# Patient Record
Sex: FEMALE | Race: WHITE | NOT HISPANIC OR LATINO | Employment: UNEMPLOYED | URBAN - METROPOLITAN AREA
[De-identification: names, ages, dates, MRNs, and addresses within clinical notes are randomized per-mention and may not be internally consistent; named-entity substitution may affect disease eponyms.]

---

## 2023-01-01 ENCOUNTER — NURSING HOME VISIT (OUTPATIENT)
Dept: POST ACUTE CARE | Facility: EXTERNAL LOCATION | Age: 78
End: 2023-01-01
Payer: MEDICARE

## 2023-01-01 VITALS
BODY MASS INDEX: 17.76 KG/M2 | DIASTOLIC BLOOD PRESSURE: 64 MMHG | TEMPERATURE: 97.6 F | SYSTOLIC BLOOD PRESSURE: 112 MMHG | OXYGEN SATURATION: 94 % | RESPIRATION RATE: 18 BRPM | HEART RATE: 76 BPM | WEIGHT: 94 LBS

## 2023-01-01 VITALS
TEMPERATURE: 98.1 F | OXYGEN SATURATION: 93 % | RESPIRATION RATE: 18 BRPM | SYSTOLIC BLOOD PRESSURE: 132 MMHG | DIASTOLIC BLOOD PRESSURE: 78 MMHG | WEIGHT: 94 LBS | BODY MASS INDEX: 17.76 KG/M2 | HEART RATE: 76 BPM

## 2023-01-01 VITALS
WEIGHT: 96 LBS | HEART RATE: 64 BPM | TEMPERATURE: 97.8 F | BODY MASS INDEX: 16.59 KG/M2 | RESPIRATION RATE: 18 BRPM | SYSTOLIC BLOOD PRESSURE: 112 MMHG | DIASTOLIC BLOOD PRESSURE: 64 MMHG | OXYGEN SATURATION: 95 %

## 2023-01-01 VITALS
DIASTOLIC BLOOD PRESSURE: 65 MMHG | WEIGHT: 95 LBS | OXYGEN SATURATION: 95 % | TEMPERATURE: 97.9 F | BODY MASS INDEX: 17.95 KG/M2 | RESPIRATION RATE: 18 BRPM | HEART RATE: 74 BPM | SYSTOLIC BLOOD PRESSURE: 117 MMHG

## 2023-01-01 VITALS
OXYGEN SATURATION: 96 % | HEART RATE: 64 BPM | SYSTOLIC BLOOD PRESSURE: 112 MMHG | RESPIRATION RATE: 18 BRPM | TEMPERATURE: 97.9 F | BODY MASS INDEX: 16.07 KG/M2 | DIASTOLIC BLOOD PRESSURE: 64 MMHG | WEIGHT: 93 LBS

## 2023-01-01 VITALS
SYSTOLIC BLOOD PRESSURE: 112 MMHG | DIASTOLIC BLOOD PRESSURE: 64 MMHG | OXYGEN SATURATION: 96 % | HEART RATE: 64 BPM | BODY MASS INDEX: 16.42 KG/M2 | WEIGHT: 95 LBS | RESPIRATION RATE: 18 BRPM | TEMPERATURE: 97.9 F

## 2023-01-01 VITALS
OXYGEN SATURATION: 95 % | TEMPERATURE: 98 F | BODY MASS INDEX: 17.76 KG/M2 | SYSTOLIC BLOOD PRESSURE: 117 MMHG | RESPIRATION RATE: 18 BRPM | HEART RATE: 74 BPM | WEIGHT: 94 LBS | DIASTOLIC BLOOD PRESSURE: 65 MMHG

## 2023-01-01 VITALS
RESPIRATION RATE: 18 BRPM | TEMPERATURE: 98.1 F | HEART RATE: 78 BPM | OXYGEN SATURATION: 94 % | SYSTOLIC BLOOD PRESSURE: 133 MMHG | DIASTOLIC BLOOD PRESSURE: 63 MMHG | BODY MASS INDEX: 16.77 KG/M2 | WEIGHT: 97 LBS

## 2023-01-01 VITALS
BODY MASS INDEX: 16.25 KG/M2 | OXYGEN SATURATION: 96 % | RESPIRATION RATE: 18 BRPM | HEART RATE: 64 BPM | SYSTOLIC BLOOD PRESSURE: 112 MMHG | TEMPERATURE: 98.5 F | DIASTOLIC BLOOD PRESSURE: 64 MMHG | WEIGHT: 94 LBS

## 2023-01-01 VITALS
WEIGHT: 95 LBS | SYSTOLIC BLOOD PRESSURE: 107 MMHG | DIASTOLIC BLOOD PRESSURE: 82 MMHG | TEMPERATURE: 98.2 F | HEART RATE: 58 BPM | OXYGEN SATURATION: 97 % | RESPIRATION RATE: 16 BRPM | BODY MASS INDEX: 17.95 KG/M2

## 2023-01-01 VITALS
SYSTOLIC BLOOD PRESSURE: 107 MMHG | DIASTOLIC BLOOD PRESSURE: 82 MMHG | WEIGHT: 92 LBS | BODY MASS INDEX: 17.38 KG/M2 | OXYGEN SATURATION: 97 % | TEMPERATURE: 97.8 F | RESPIRATION RATE: 16 BRPM | HEART RATE: 58 BPM

## 2023-01-01 VITALS
WEIGHT: 97 LBS | HEART RATE: 64 BPM | SYSTOLIC BLOOD PRESSURE: 112 MMHG | DIASTOLIC BLOOD PRESSURE: 64 MMHG | TEMPERATURE: 98 F | RESPIRATION RATE: 18 BRPM | OXYGEN SATURATION: 95 % | BODY MASS INDEX: 16.77 KG/M2

## 2023-01-01 VITALS
SYSTOLIC BLOOD PRESSURE: 132 MMHG | WEIGHT: 93 LBS | HEART RATE: 76 BPM | OXYGEN SATURATION: 93 % | TEMPERATURE: 98.1 F | DIASTOLIC BLOOD PRESSURE: 78 MMHG | RESPIRATION RATE: 18 BRPM | BODY MASS INDEX: 17.57 KG/M2

## 2023-01-01 VITALS
HEART RATE: 68 BPM | BODY MASS INDEX: 18.14 KG/M2 | WEIGHT: 96 LBS | RESPIRATION RATE: 18 BRPM | TEMPERATURE: 98.3 F | SYSTOLIC BLOOD PRESSURE: 122 MMHG | OXYGEN SATURATION: 97 % | DIASTOLIC BLOOD PRESSURE: 70 MMHG

## 2023-01-01 DIAGNOSIS — R53.81 DEBILITY: ICD-10-CM

## 2023-01-01 DIAGNOSIS — D32.9 MENINGIOMA (MULTI): ICD-10-CM

## 2023-01-01 DIAGNOSIS — F02.C0 SEVERE ALZHEIMER'S DEMENTIA WITHOUT BEHAVIORAL DISTURBANCE, PSYCHOTIC DISTURBANCE, MOOD DISTURBANCE, OR ANXIETY, UNSPECIFIED TIMING OF DEMENTIA ONSET (MULTI): ICD-10-CM

## 2023-01-01 DIAGNOSIS — R56.9 SEIZURES (MULTI): ICD-10-CM

## 2023-01-01 DIAGNOSIS — G30.9 SEVERE ALZHEIMER'S DEMENTIA WITHOUT BEHAVIORAL DISTURBANCE, PSYCHOTIC DISTURBANCE, MOOD DISTURBANCE, OR ANXIETY, UNSPECIFIED TIMING OF DEMENTIA ONSET (MULTI): Primary | ICD-10-CM

## 2023-01-01 DIAGNOSIS — G30.9 SEVERE ALZHEIMER'S DEMENTIA WITHOUT BEHAVIORAL DISTURBANCE, PSYCHOTIC DISTURBANCE, MOOD DISTURBANCE, OR ANXIETY, UNSPECIFIED TIMING OF DEMENTIA ONSET (MULTI): ICD-10-CM

## 2023-01-01 DIAGNOSIS — D32.9 MENINGIOMA (MULTI): Primary | ICD-10-CM

## 2023-01-01 DIAGNOSIS — R56.9 SEIZURES (MULTI): Primary | ICD-10-CM

## 2023-01-01 DIAGNOSIS — M81.0 OSTEOPOROSIS, UNSPECIFIED OSTEOPOROSIS TYPE, UNSPECIFIED PATHOLOGICAL FRACTURE PRESENCE: ICD-10-CM

## 2023-01-01 DIAGNOSIS — F02.C0 SEVERE ALZHEIMER'S DEMENTIA WITHOUT BEHAVIORAL DISTURBANCE, PSYCHOTIC DISTURBANCE, MOOD DISTURBANCE, OR ANXIETY, UNSPECIFIED TIMING OF DEMENTIA ONSET (MULTI): Primary | ICD-10-CM

## 2023-01-01 PROCEDURE — 99309 SBSQ NF CARE MODERATE MDM 30: CPT | Performed by: NURSE PRACTITIONER

## 2023-01-01 PROCEDURE — 99309 SBSQ NF CARE MODERATE MDM 30: CPT | Performed by: INTERNAL MEDICINE

## 2023-01-01 PROCEDURE — 99308 SBSQ NF CARE LOW MDM 20: CPT | Performed by: INTERNAL MEDICINE

## 2023-01-01 RX ORDER — LACOSAMIDE 100 MG/1
100 TABLET ORAL 2 TIMES DAILY
Qty: 360 TABLET | Refills: 0 | Status: SHIPPED | OUTPATIENT
Start: 2023-01-01 | End: 2024-01-01 | Stop reason: SDUPTHER

## 2023-01-01 RX ORDER — LACOSAMIDE 100 MG/1
100 TABLET ORAL 2 TIMES DAILY
Qty: 360 TABLET | Refills: 0 | Status: SHIPPED | OUTPATIENT
Start: 2023-01-01 | End: 2023-01-01 | Stop reason: SDUPTHER

## 2023-01-01 RX ORDER — LACOSAMIDE 100 MG/1
100 TABLET ORAL 2 TIMES DAILY
COMMUNITY
Start: 2021-09-15 | End: 2023-01-01 | Stop reason: SDUPTHER

## 2023-01-01 ASSESSMENT — ENCOUNTER SYMPTOMS
CHILLS: 0
DIARRHEA: 0
COUGH: 0
MYALGIAS: 0
VOMITING: 0
CONSTIPATION: 0
VOMITING: 0
VOMITING: 0
JOINT SWELLING: 0
FEVER: 0
FEVER: 0
CHILLS: 0
SHORTNESS OF BREATH: 0
MYALGIAS: 0
CONSTIPATION: 0
NAUSEA: 0
VOMITING: 0
COUGH: 0
CONSTIPATION: 0
SHORTNESS OF BREATH: 0
CHILLS: 0
MYALGIAS: 0
SHORTNESS OF BREATH: 0
FEVER: 0
DIAPHORESIS: 0
DIARRHEA: 0
DIAPHORESIS: 0
MYALGIAS: 0
NAUSEA: 0
COUGH: 0
FEVER: 0
DIAPHORESIS: 0
FEVER: 0
COUGH: 0
MYALGIAS: 0
CONSTIPATION: 0
SHORTNESS OF BREATH: 0
VOMITING: 0
JOINT SWELLING: 0
CHILLS: 0
CHILLS: 0
COUGH: 0
JOINT SWELLING: 0
JOINT SWELLING: 0
DIAPHORESIS: 0
VOMITING: 0
DIAPHORESIS: 0
FEVER: 0
DIARRHEA: 0
SHORTNESS OF BREATH: 0
DIARRHEA: 0
DIAPHORESIS: 0
DIARRHEA: 0
DIARRHEA: 0
NAUSEA: 0
MYALGIAS: 0
CONSTIPATION: 0
NAUSEA: 0
CONSTIPATION: 0
SHORTNESS OF BREATH: 0
COUGH: 0
CHILLS: 0

## 2023-03-11 PROBLEM — D32.9 MENINGIOMA (MULTI): Status: ACTIVE | Noted: 2023-03-11

## 2023-03-11 PROBLEM — F03.90 DEMENTIA (MULTI): Status: ACTIVE | Noted: 2023-03-11

## 2023-03-11 PROBLEM — R56.9 SEIZURES (MULTI): Status: ACTIVE | Noted: 2023-03-11

## 2023-03-11 PROBLEM — R53.81 DEBILITY: Status: ACTIVE | Noted: 2023-03-11

## 2023-03-11 PROBLEM — M81.0 OSTEOPOROSIS: Status: ACTIVE | Noted: 2023-03-11

## 2023-03-11 PROBLEM — R63.4 WEIGHT LOSS: Status: ACTIVE | Noted: 2023-03-11

## 2023-04-18 NOTE — PROGRESS NOTES
Subjective   Karyn Owens is a 77 y.o. female Here for routine monthly visit.    HPI There are no new problems and multiple health problems have been reviewed.  The course has been unchanged.  The patient  is moderately confused.   There are no family members present during time of visit.    she  is resting in bed, denies any complaints when asked.  Eating and drinking fair per staff.    No concerns per staff.       Review of Systems   Constitutional:         Difficult to obtain due to dementia, denies any complaints when asked.        Objective   /65   Pulse 74   Temp 36.7 °C (98 °F)   Resp 18   Wt (!) 42.6 kg (94 lb)   SpO2 95%   BMI 17.76 kg/m²     Physical Exam  Constitutional:       General: She is not in acute distress.     Comments: Frail, cachectic.    HENT:      Head: Normocephalic and atraumatic.   Eyes:      Conjunctiva/sclera: Conjunctivae normal.   Cardiovascular:      Rate and Rhythm: Normal rate and regular rhythm.   Pulmonary:      Effort: Pulmonary effort is normal. No respiratory distress.      Breath sounds: Normal breath sounds.   Abdominal:      General: Bowel sounds are normal. There is no distension.      Palpations: Abdomen is soft.      Tenderness: There is no abdominal tenderness.   Musculoskeletal:      Right lower leg: No edema.      Left lower leg: No edema.      Comments: diffusely decreased muscle mass  Bilateral lower extremity contractures  Severe kyphosis   Skin:     General: Skin is warm and dry.   Neurological:      Mental Status: She is alert. Mental status is at baseline. She is disoriented.   Psychiatric:         Mood and Affect: Mood normal.         Behavior: Behavior normal.         Assessment/Plan   Problem List Items Addressed This Visit          Nervous    Dementia (CMS/HCC) - Primary     Oriented times 2, forgetful, slow decline.  No acute changes.          Meningioma (CMS/HCC)     S/p resection 9/2021, she and poa declined radiation, further treatment or  evaluation.          Seizures (CMS/HCC)     no known recent seizures.  Staff to monitor and notify for any seizure activity, on keppra.               Other    Debility     Requires assistance with ADL's.          labs/meds/orders reviewed  staff to monitor and notify for any changes.  continue with supportive and restorative care  next labs 5/23 and prn

## 2023-04-18 NOTE — LETTER
Patient: Karyn Ownes  : 1945    Encounter Date: 2023    Subjective  Karyn Owens is a 77 y.o. female Here for routine monthly visit.    HPI There are no new problems and multiple health problems have been reviewed.  The course has been unchanged.  The patient  is moderately confused.   There are no family members present during time of visit.    she  is resting in bed, denies any complaints when asked.  Eating and drinking fair per staff.    No concerns per staff.       Review of Systems   Constitutional:         Difficult to obtain due to dementia, denies any complaints when asked.        Objective  /65   Pulse 74   Temp 36.7 °C (98 °F)   Resp 18   Wt (!) 42.6 kg (94 lb)   SpO2 95%   BMI 17.76 kg/m²     Physical Exam  Constitutional:       General: She is not in acute distress.     Comments: Frail, cachectic.    HENT:      Head: Normocephalic and atraumatic.   Eyes:      Conjunctiva/sclera: Conjunctivae normal.   Cardiovascular:      Rate and Rhythm: Normal rate and regular rhythm.   Pulmonary:      Effort: Pulmonary effort is normal. No respiratory distress.      Breath sounds: Normal breath sounds.   Abdominal:      General: Bowel sounds are normal. There is no distension.      Palpations: Abdomen is soft.      Tenderness: There is no abdominal tenderness.   Musculoskeletal:      Right lower leg: No edema.      Left lower leg: No edema.      Comments: diffusely decreased muscle mass  Bilateral lower extremity contractures  Severe kyphosis   Skin:     General: Skin is warm and dry.   Neurological:      Mental Status: She is alert. Mental status is at baseline. She is disoriented.   Psychiatric:         Mood and Affect: Mood normal.         Behavior: Behavior normal.         Assessment/Plan  Problem List Items Addressed This Visit          Nervous    Dementia (CMS/HCC) - Primary     Oriented times 2, forgetful, slow decline.  No acute changes.          Meningioma (CMS/HCC)     S/p  resection 9/2021, she and poa declined radiation, further treatment or evaluation.          Seizures (CMS/HCC)     no known recent seizures.  Staff to monitor and notify for any seizure activity, on keppra.               Other    Debility     Requires assistance with ADL's.          labs/meds/orders reviewed  staff to monitor and notify for any changes.  continue with supportive and restorative care  next labs 5/23 and prn      Electronically Signed By: TIMUR Mendez-CNP   4/18/23 12:14 PM

## 2023-04-21 NOTE — LETTER
Patient: Karyn Owens  : 1945    Encounter Date: 2023    Subjective  Patient ID: Karyn Owens is a 77 y.o. female who is long term resident being seen and evaluated for multiple medical problems.    HPI   This 77-year-old female patient is resting comfortably in her wheelchair no distress.  She has no complaints of pain or shortness of breath.  Nursing has no new adverse events reported to me.    Current high risk medication:  Keppra  Lacosamide  Senna    Laboratory examination from 2022:  Levetiracetam 30  Laboratory examination from 2022:  Potassium 4.7  Bicarbonate 27  Creatinine 0.7  Albumin 3.8  Liver injury test normal  Hemoglobin 14.6    Review of Systems   Constitutional:  Negative for chills, diaphoresis and fever.   Respiratory:  Negative for cough and shortness of breath.    Cardiovascular:  Negative for chest pain and leg swelling.   Gastrointestinal:  Negative for constipation, diarrhea, nausea and vomiting.   Musculoskeletal:  Negative for joint swelling and myalgias.       Objective  /65   Pulse 74   Temp 36.6 °C (97.9 °F)   Resp 18   Wt (!) 43.1 kg (95 lb)   SpO2 95%   BMI 17.95 kg/m²     Physical Exam  Vitals reviewed.   Constitutional:       General: She is not in acute distress.     Appearance: She is not ill-appearing.      Comments: Frail contracted debilitated female in no distress   Cardiovascular:      Rate and Rhythm: Normal rate and regular rhythm.      Pulses: Normal pulses.      Heart sounds:      No gallop.   Pulmonary:      Breath sounds: Normal breath sounds. No wheezing, rhonchi or rales.   Abdominal:      General: Abdomen is flat. Bowel sounds are normal.      Palpations: Abdomen is soft.      Tenderness: There is no guarding or rebound.   Musculoskeletal:      Right lower leg: No edema.      Left lower leg: No edema.      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  She has flexion  contractures of her lower extremities         Assessment/Plan  Problem List Items Addressed This Visit          Nervous    Dementia (CMS/HCC)    Meningioma (CMS/HCC)    Seizures (CMS/HCC) - Primary       Other    Debility     A.  We will continue with restorative and supportive care as the patient tolerates    B.  Laboratory examinations will next be due in May 2023 or as needed including TSH vitamin B12 and vitamin D    C.  The patient's prognosis is guarded.        Electronically Signed By: Kamlesh Edwards MD   4/24/23  2:22 PM

## 2023-04-21 NOTE — PROGRESS NOTES
Subjective   Patient ID: Karyn Owens is a 77 y.o. female who is long term resident being seen and evaluated for multiple medical problems.    HPI   This 77-year-old female patient is resting comfortably in her wheelchair no distress.  She has no complaints of pain or shortness of breath.  Nursing has no new adverse events reported to me.    Current high risk medication:  Keppra  Lacosamide  Senna    Laboratory examination from December 2022:  Levetiracetam 30  Laboratory examination from November 2022:  Potassium 4.7  Bicarbonate 27  Creatinine 0.7  Albumin 3.8  Liver injury test normal  Hemoglobin 14.6    Review of Systems   Constitutional:  Negative for chills, diaphoresis and fever.   Respiratory:  Negative for cough and shortness of breath.    Cardiovascular:  Negative for chest pain and leg swelling.   Gastrointestinal:  Negative for constipation, diarrhea, nausea and vomiting.   Musculoskeletal:  Negative for joint swelling and myalgias.       Objective   /65   Pulse 74   Temp 36.6 °C (97.9 °F)   Resp 18   Wt (!) 43.1 kg (95 lb)   SpO2 95%   BMI 17.95 kg/m²     Physical Exam  Vitals reviewed.   Constitutional:       General: She is not in acute distress.     Appearance: She is not ill-appearing.      Comments: Frail contracted debilitated female in no distress   Cardiovascular:      Rate and Rhythm: Normal rate and regular rhythm.      Pulses: Normal pulses.      Heart sounds:      No gallop.   Pulmonary:      Breath sounds: Normal breath sounds. No wheezing, rhonchi or rales.   Abdominal:      General: Abdomen is flat. Bowel sounds are normal.      Palpations: Abdomen is soft.      Tenderness: There is no guarding or rebound.   Musculoskeletal:      Right lower leg: No edema.      Left lower leg: No edema.      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  She has flexion contractures of her lower extremities         Assessment/Plan   Problem List  Items Addressed This Visit          Nervous    Dementia (CMS/HCC)    Meningioma (CMS/HCC)    Seizures (CMS/HCC) - Primary       Other    Debility     A.  We will continue with restorative and supportive care as the patient tolerates    B.  Laboratory examinations will next be due in May 2023 or as needed including TSH vitamin B12 and vitamin D    C.  The patient's prognosis is guarded.

## 2023-05-26 NOTE — LETTER
Patient: Karyn Owens  : 1945    Encounter Date: 2023    Subjective  Patient ID: Karyn Owens is a 77 y.o. female who is long term resident being seen and evaluated for multiple medical problems.    HPI   This 77-year-old female patient is up and about in her wheelchair no distress.  She has no complaints for me today.  Nursing has no new adverse events reported to me.    Current high risk medication:  Keppra  Lacosamide  Senna    Laboratory examination from 2022:  Keppra 30  Laboratory examination from 2022 have been reviewed in detail and appear to be stable    Review of Systems   Constitutional:  Negative for chills, diaphoresis and fever.   Respiratory:  Negative for cough and shortness of breath.    Cardiovascular:  Negative for chest pain and leg swelling.   Gastrointestinal:  Negative for constipation, diarrhea, nausea and vomiting.   Musculoskeletal:  Negative for joint swelling and myalgias.       Objective  /70   Pulse 68   Temp 36.8 °C (98.3 °F)   Resp 18   Wt (!) 43.5 kg (96 lb)   SpO2 97%   BMI 18.14 kg/m²     Physical Exam  Vitals reviewed.   Constitutional:       General: She is not in acute distress.     Appearance: She is not ill-appearing.      Comments: Frail contracted debilitated female in no distress   Cardiovascular:      Rate and Rhythm: Normal rate and regular rhythm.      Pulses: Normal pulses.      Heart sounds:      No gallop.   Pulmonary:      Breath sounds: Normal breath sounds. No wheezing, rhonchi or rales.   Abdominal:      General: Abdomen is flat. Bowel sounds are normal.      Palpations: Abdomen is soft.      Tenderness: There is no guarding or rebound.   Musculoskeletal:      Right lower leg: No edema.      Left lower leg: No edema.      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  She has flexion contractures of her lower extremities         Assessment/Plan  Problem List Items  Addressed This Visit          Nervous    Dementia (CMS/HCC)    Meningioma (CMS/HCC)    Seizures (CMS/HCC) - Primary       Musculoskeletal    Osteoporosis       Other    Debility       A.  We will continue restorative and supportive care as the patient tolerates    B.  Laboratory examination ordered now to monitor medications and medical problems    C.  The patient's prognosis is guarded.      Electronically Signed By: Kamlesh Edwards MD   6/5/23  4:34 PM

## 2023-05-30 NOTE — PROGRESS NOTES
Subjective   Patient ID: Karyn Owens is a 77 y.o. female who is long term resident being seen and evaluated for multiple medical problems.    HPI   This 77-year-old female patient is up and about in her wheelchair no distress.  She has no complaints for me today.  Nursing has no new adverse events reported to me.    Current high risk medication:  Keppra  Lacosamide  Senna    Laboratory examination from December 2022:  Keppra 30  Laboratory examination from November 2022 have been reviewed in detail and appear to be stable    Review of Systems   Constitutional:  Negative for chills, diaphoresis and fever.   Respiratory:  Negative for cough and shortness of breath.    Cardiovascular:  Negative for chest pain and leg swelling.   Gastrointestinal:  Negative for constipation, diarrhea, nausea and vomiting.   Musculoskeletal:  Negative for joint swelling and myalgias.       Objective   /70   Pulse 68   Temp 36.8 °C (98.3 °F)   Resp 18   Wt (!) 43.5 kg (96 lb)   SpO2 97%   BMI 18.14 kg/m²     Physical Exam  Vitals reviewed.   Constitutional:       General: She is not in acute distress.     Appearance: She is not ill-appearing.      Comments: Frail contracted debilitated female in no distress   Cardiovascular:      Rate and Rhythm: Normal rate and regular rhythm.      Pulses: Normal pulses.      Heart sounds:      No gallop.   Pulmonary:      Breath sounds: Normal breath sounds. No wheezing, rhonchi or rales.   Abdominal:      General: Abdomen is flat. Bowel sounds are normal.      Palpations: Abdomen is soft.      Tenderness: There is no guarding or rebound.   Musculoskeletal:      Right lower leg: No edema.      Left lower leg: No edema.      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  She has flexion contractures of her lower extremities         Assessment/Plan   Problem List Items Addressed This Visit          Nervous    Dementia (CMS/HCC)    Meningioma  (CMS/[facility])    Seizures (CMS/[facility]) - Primary       Musculoskeletal    Osteoporosis       Other    Debility       A.  We will continue restorative and supportive care as the patient tolerates    B.  Laboratory examination ordered now to monitor medications and medical problems    C.  The patient's prognosis is guarded.

## 2023-06-13 NOTE — LETTER
Patient: Karyn Owens  : 1945    Encounter Date: 2023    Subjective  Karyn Owens is a 77 y.o. female Here for routine monthly visit.    HPI There are no new problems and multiple health problems have been reviewed.  The course has been unchanged.  The patient  is moderately confused.   There are no family members present during time of visit.    she  is resting in bed, denies any complaints when asked.  Eating and drinking fair per staff.    No concerns per staff.       Review of Systems   Constitutional:         Difficult to obtain due to dementia, denies any complaints when asked.        Objective  /82   Pulse 58   Temp 36.8 °C (98.2 °F)   Resp 16   Wt (!) 43.1 kg (95 lb)   SpO2 97%   BMI 17.95 kg/m²     Physical Exam  Constitutional:       General: She is not in acute distress.     Comments: Frail, cachectic.    HENT:      Head: Normocephalic and atraumatic.   Eyes:      Conjunctiva/sclera: Conjunctivae normal.   Cardiovascular:      Rate and Rhythm: Normal rate and regular rhythm.   Pulmonary:      Effort: Pulmonary effort is normal. No respiratory distress.      Breath sounds: Normal breath sounds.   Abdominal:      General: Bowel sounds are normal. There is no distension.      Palpations: Abdomen is soft.      Tenderness: There is no abdominal tenderness.   Musculoskeletal:      Right lower leg: No edema.      Left lower leg: No edema.      Comments: diffusely decreased muscle mass  Bilateral lower extremity contractures  Severe kyphosis   Skin:     General: Skin is warm and dry.   Neurological:      Mental Status: She is alert. Mental status is at baseline. She is disoriented.   Psychiatric:         Mood and Affect: Mood normal.         Behavior: Behavior normal.         Assessment/Plan  Problem List Items Addressed This Visit          Nervous    Dementia (CMS/HCC)     Oriented times 2, forgetful, slow decline.  No acute changes.          Meningioma (CMS/HCC) - Primary     S/p  resection 9/2021, she and poa declined radiation, further treatment or evaluation.          Seizures (CMS/HCC)     no known recent seizures.  Staff to monitor and notify for any seizure activity, on keppra.   Keppra level therapeutic                 Other    Debility     Requires assistance with ADL's.          labs/meds/orders reviewed  staff to monitor and notify for any changes.  continue with supportive and restorative care  next labs 12/23 and prn      Electronically Signed By: TIMUR Mendez-CNP   6/13/23 11:43 AM

## 2023-06-13 NOTE — ASSESSMENT & PLAN NOTE
no known recent seizures.  Staff to monitor and notify for any seizure activity, on keppra.   Keppra level therapeutic

## 2023-06-13 NOTE — PROGRESS NOTES
Subjective   Karyn Owens is a 77 y.o. female Here for routine monthly visit.    HPI There are no new problems and multiple health problems have been reviewed.  The course has been unchanged.  The patient  is moderately confused.   There are no family members present during time of visit.    she  is resting in bed, denies any complaints when asked.  Eating and drinking fair per staff.    No concerns per staff.       Review of Systems   Constitutional:         Difficult to obtain due to dementia, denies any complaints when asked.        Objective   /82   Pulse 58   Temp 36.8 °C (98.2 °F)   Resp 16   Wt (!) 43.1 kg (95 lb)   SpO2 97%   BMI 17.95 kg/m²     Physical Exam  Constitutional:       General: She is not in acute distress.     Comments: Frail, cachectic.    HENT:      Head: Normocephalic and atraumatic.   Eyes:      Conjunctiva/sclera: Conjunctivae normal.   Cardiovascular:      Rate and Rhythm: Normal rate and regular rhythm.   Pulmonary:      Effort: Pulmonary effort is normal. No respiratory distress.      Breath sounds: Normal breath sounds.   Abdominal:      General: Bowel sounds are normal. There is no distension.      Palpations: Abdomen is soft.      Tenderness: There is no abdominal tenderness.   Musculoskeletal:      Right lower leg: No edema.      Left lower leg: No edema.      Comments: diffusely decreased muscle mass  Bilateral lower extremity contractures  Severe kyphosis   Skin:     General: Skin is warm and dry.   Neurological:      Mental Status: She is alert. Mental status is at baseline. She is disoriented.   Psychiatric:         Mood and Affect: Mood normal.         Behavior: Behavior normal.         Assessment/Plan   Problem List Items Addressed This Visit          Nervous    Dementia (CMS/HCC)     Oriented times 2, forgetful, slow decline.  No acute changes.          Meningioma (CMS/HCC) - Primary     S/p resection 9/2021, she and poa declined radiation, further treatment or  evaluation.          Seizures (CMS/HCC)     no known recent seizures.  Staff to monitor and notify for any seizure activity, on keppra.   Keppra level therapeutic                 Other    Debility     Requires assistance with ADL's.          labs/meds/orders reviewed  staff to monitor and notify for any changes.  continue with supportive and restorative care  next labs 12/23 and prn

## 2023-06-16 NOTE — LETTER
Patient: Karyn Owens  : 1945    Encounter Date: 2023    Subjective  Patient ID: Karyn Owens is a 77 y.o. female who is long term resident being seen and evaluated for multiple medical problems.    HPI   This frail 77-year-old female patient is up and about in the hallway in her wheelchair no distress.  She has absolutely no complaints of pain or shortness of breath me at this time and nursing has no adverse events reported to me.    Current high risk medication:  Keppra  Lacosamide  Senna    Laboratory examination from 2023:  Vitamin B12 272  TSH 0.85  Vitamin D 30  Keppra 34  Potassium 4.1  Bicarbonate 29  Creatinine 0.7  Albumin 3.5  Liver injury test normal  Hemoglobin 13.7    Review of Systems   Constitutional:  Negative for chills, diaphoresis and fever.   Respiratory:  Negative for cough and shortness of breath.    Cardiovascular:  Negative for chest pain and leg swelling.   Gastrointestinal:  Negative for constipation, diarrhea, nausea and vomiting.   Musculoskeletal:  Negative for joint swelling and myalgias.       Objective  /82   Pulse 58   Temp 36.6 °C (97.8 °F)   Resp 16   Wt (!) 41.7 kg (92 lb)   SpO2 97%   BMI 17.38 kg/m²     Physical Exam  Vitals reviewed.   Constitutional:       General: She is not in acute distress.     Appearance: She is not ill-appearing.      Comments: Frail contracted debilitated female in no distress   Cardiovascular:      Rate and Rhythm: Normal rate and regular rhythm.      Pulses: Normal pulses.      Heart sounds:      No gallop.   Pulmonary:      Breath sounds: Normal breath sounds. No wheezing, rhonchi or rales.   Abdominal:      General: Abdomen is flat. Bowel sounds are normal.      Palpations: Abdomen is soft.      Tenderness: There is no guarding or rebound.   Musculoskeletal:      Right lower leg: No edema.      Left lower leg: No edema.      Comments: The patient has severe kyphoscoliotic deformity of her cervical thoracic  spine.  She has flexion contractures of her lower extremities         Assessment/Plan  Problem List Items Addressed This Visit       Debility    Dementia (CMS/HCC)    Meningioma (CMS/HCC)    Osteoporosis    Seizures (CMS/HCC) - Primary       A.  We will continue with restorative and supportive care as patient tolerates    B.  Laboratory examinations will next be due in December 2023 or as needed    C.  The patient's prognosis is guarded.      Electronically Signed By: Kamlesh Edwards MD   6/28/23  3:17 PM

## 2023-06-19 NOTE — PROGRESS NOTES
Subjective   Patient ID: Karyn Owens is a 77 y.o. female who is long term resident being seen and evaluated for multiple medical problems.    HPI   This frail 77-year-old female patient is up and about in the hallway in her wheelchair no distress.  She has absolutely no complaints of pain or shortness of breath me at this time and nursing has no adverse events reported to me.    Current high risk medication:  Keppra  Lacosamide  Senna    Laboratory examination from June 2023:  Vitamin B12 272  TSH 0.85  Vitamin D 30  Keppra 34  Potassium 4.1  Bicarbonate 29  Creatinine 0.7  Albumin 3.5  Liver injury test normal  Hemoglobin 13.7    Review of Systems   Constitutional:  Negative for chills, diaphoresis and fever.   Respiratory:  Negative for cough and shortness of breath.    Cardiovascular:  Negative for chest pain and leg swelling.   Gastrointestinal:  Negative for constipation, diarrhea, nausea and vomiting.   Musculoskeletal:  Negative for joint swelling and myalgias.       Objective   /82   Pulse 58   Temp 36.6 °C (97.8 °F)   Resp 16   Wt (!) 41.7 kg (92 lb)   SpO2 97%   BMI 17.38 kg/m²     Physical Exam  Vitals reviewed.   Constitutional:       General: She is not in acute distress.     Appearance: She is not ill-appearing.      Comments: Frail contracted debilitated female in no distress   Cardiovascular:      Rate and Rhythm: Normal rate and regular rhythm.      Pulses: Normal pulses.      Heart sounds:      No gallop.   Pulmonary:      Breath sounds: Normal breath sounds. No wheezing, rhonchi or rales.   Abdominal:      General: Abdomen is flat. Bowel sounds are normal.      Palpations: Abdomen is soft.      Tenderness: There is no guarding or rebound.   Musculoskeletal:      Right lower leg: No edema.      Left lower leg: No edema.      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  She has flexion contractures of her lower extremities          Assessment/Plan   Problem List Items Addressed This Visit       Debility    Dementia (CMS/HCC)    Meningioma (CMS/HCC)    Osteoporosis    Seizures (CMS/HCC) - Primary       A.  We will continue with restorative and supportive care as patient tolerates    B.  Laboratory examinations will next be due in December 2023 or as needed    C.  The patient's prognosis is guarded.

## 2023-07-21 NOTE — LETTER
Patient: Karyn Owens  : 1945    Encounter Date: 2023    Subjective  Patient ID: Karyn Owens is a 77 y.o. female who is long term resident being seen and evaluated for multiple medical problems.    HPI   This 77-year-old female patient is up and about in her wheelchair as per usual in no distress.  She has no complaints of pain or shortness of breath and nursing has no new adverse events reported to me at this time.    Current high risk medication:  Keppra  Lacosamide  Senna    Laboratory examination from 2023:  Vitamin B12 272  TSH 0.85  Vitamin D 30  Keppra 34  Potassium 4.1  Bicarbonate 29  Creatinine 0.7  Albumin 3.8  Liver injury test normal  Hemoglobin 13.7  Review of Systems   Constitutional:  Negative for chills, diaphoresis and fever.   Respiratory:  Negative for cough and shortness of breath.    Cardiovascular:  Negative for chest pain and leg swelling.   Gastrointestinal:  Negative for constipation, diarrhea, nausea and vomiting.   Musculoskeletal:  Negative for joint swelling and myalgias.       Objective  /64   Pulse 76   Temp 36.4 °C (97.6 °F)   Resp 18   Wt (!) 42.6 kg (94 lb)   SpO2 94%   BMI 17.76 kg/m²     Physical Exam  Vitals reviewed.   Constitutional:       General: She is not in acute distress.     Appearance: She is not ill-appearing.      Comments: Frail contracted debilitated female in no distress   Cardiovascular:      Rate and Rhythm: Normal rate and regular rhythm.      Pulses: Normal pulses.      Heart sounds:      No gallop.   Pulmonary:      Breath sounds: Normal breath sounds. No wheezing, rhonchi or rales.   Abdominal:      General: Abdomen is flat. Bowel sounds are normal.      Palpations: Abdomen is soft.      Tenderness: There is no guarding or rebound.   Musculoskeletal:      Right lower leg: No edema.      Left lower leg: No edema.      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  She has  flexion contractures of her lower extremities         Assessment/Plan  Problem List Items Addressed This Visit       Debility    Dementia (CMS/[facility])    Osteoporosis    Seizures (CMS/[facility]) - Primary     A.  We will continue with restorative and supportive care as patient tolerates    B.  Laboratory examinations will next be due in December 2023 or as needed    C.  The patient's prognosis is fair-2-guarded.        Electronically Signed By: Kamlesh Edwards MD   7/27/23  9:22 AM

## 2023-07-26 NOTE — PROGRESS NOTES
Subjective   Patient ID: Karyn Owens is a 77 y.o. female who is long term resident being seen and evaluated for multiple medical problems.    HPI   This 77-year-old female patient is up and about in her wheelchair as per usual in no distress.  She has no complaints of pain or shortness of breath and nursing has no new adverse events reported to me at this time.    Current high risk medication:  Keppra  Lacosamide  Senna    Laboratory examination from June 6, 2023:  Vitamin B12 272  TSH 0.85  Vitamin D 30  Keppra 34  Potassium 4.1  Bicarbonate 29  Creatinine 0.7  Albumin 3.8  Liver injury test normal  Hemoglobin 13.7  Review of Systems   Constitutional:  Negative for chills, diaphoresis and fever.   Respiratory:  Negative for cough and shortness of breath.    Cardiovascular:  Negative for chest pain and leg swelling.   Gastrointestinal:  Negative for constipation, diarrhea, nausea and vomiting.   Musculoskeletal:  Negative for joint swelling and myalgias.       Objective   /64   Pulse 76   Temp 36.4 °C (97.6 °F)   Resp 18   Wt (!) 42.6 kg (94 lb)   SpO2 94%   BMI 17.76 kg/m²     Physical Exam  Vitals reviewed.   Constitutional:       General: She is not in acute distress.     Appearance: She is not ill-appearing.      Comments: Frail contracted debilitated female in no distress   Cardiovascular:      Rate and Rhythm: Normal rate and regular rhythm.      Pulses: Normal pulses.      Heart sounds:      No gallop.   Pulmonary:      Breath sounds: Normal breath sounds. No wheezing, rhonchi or rales.   Abdominal:      General: Abdomen is flat. Bowel sounds are normal.      Palpations: Abdomen is soft.      Tenderness: There is no guarding or rebound.   Musculoskeletal:      Right lower leg: No edema.      Left lower leg: No edema.      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  She has flexion contractures of her lower extremities         Assessment/Plan    Problem List Items Addressed This Visit       Debility    Dementia (CMS/[facility])    Osteoporosis    Seizures (CMS/[facility]) - Primary     A.  We will continue with restorative and supportive care as patient tolerates    B.  Laboratory examinations will next be due in December 2023 or as needed    C.  The patient's prognosis is fair-2-guarded.

## 2023-07-27 PROBLEM — F03.90 DEMENTIA (MULTI): Status: RESOLVED | Noted: 2023-03-11 | Resolved: 2023-01-01

## 2023-08-15 NOTE — LETTER
Patient: Karyn Owens  : 1945    Encounter Date: 08/15/2023    Subjective  Karyn Owens is a 77 y.o. female Here for routine monthly visit.    HPI There are no new problems and multiple health problems have been reviewed.  The course has been unchanged.  The patient  is moderately confused.   There are no family members present during time of visit.    she  is resting in bed, denies any complaints when asked.  Eating and drinking fair per staff.    No concerns per staff.       Review of Systems   Constitutional:         Difficult to obtain due to dementia, denies any complaints when asked.        Objective  /78   Pulse 76   Temp 36.7 °C (98.1 °F)   Resp 18   Wt (!) 42.2 kg (93 lb)   SpO2 93%   BMI 17.57 kg/m²     Physical Exam  Constitutional:       General: She is not in acute distress.     Comments: Frail, cachectic.    HENT:      Head: Normocephalic and atraumatic.   Eyes:      Conjunctiva/sclera: Conjunctivae normal.   Cardiovascular:      Rate and Rhythm: Normal rate and regular rhythm.   Pulmonary:      Effort: Pulmonary effort is normal. No respiratory distress.      Breath sounds: Normal breath sounds.   Abdominal:      General: Bowel sounds are normal. There is no distension.      Palpations: Abdomen is soft.      Tenderness: There is no abdominal tenderness.   Musculoskeletal:      Right lower leg: No edema.      Left lower leg: No edema.      Comments: diffusely decreased muscle mass  Bilateral lower extremity contractures  Severe kyphosis   Skin:     General: Skin is warm and dry.   Neurological:      Mental Status: She is alert. Mental status is at baseline. She is disoriented.   Psychiatric:         Mood and Affect: Mood normal.         Behavior: Behavior normal.         Assessment/Plan  Problem List Items Addressed This Visit       Debility     Requires assistance with ADL's.           Dementia (CMS/HCC)     Oriented times 2, forgetful, slow decline.  No acute changes.           Meningioma (CMS/HCC) - Primary     S/p resection 9/2021, she and poa declined radiation, further treatment or evaluation.          Seizures (CMS/HCC)     no known recent seizures.  Staff to monitor and notify for any seizure activity, on keppra.   Keppra level therapeutic             labs/meds/orders reviewed  staff to monitor and notify for any changes.  continue with supportive and restorative care  next labs 12/23 and prn      Electronically Signed By: TIMUR Mendez-CNP   8/15/23  1:51 PM

## 2023-08-15 NOTE — PROGRESS NOTES
Subjective   Karyn Owens is a 77 y.o. female Here for routine monthly visit.    HPI There are no new problems and multiple health problems have been reviewed.  The course has been unchanged.  The patient  is moderately confused.   There are no family members present during time of visit.    she  is resting in bed, denies any complaints when asked.  Eating and drinking fair per staff.    No concerns per staff.       Review of Systems   Constitutional:         Difficult to obtain due to dementia, denies any complaints when asked.        Objective   /78   Pulse 76   Temp 36.7 °C (98.1 °F)   Resp 18   Wt (!) 42.2 kg (93 lb)   SpO2 93%   BMI 17.57 kg/m²     Physical Exam  Constitutional:       General: She is not in acute distress.     Comments: Frail, cachectic.    HENT:      Head: Normocephalic and atraumatic.   Eyes:      Conjunctiva/sclera: Conjunctivae normal.   Cardiovascular:      Rate and Rhythm: Normal rate and regular rhythm.   Pulmonary:      Effort: Pulmonary effort is normal. No respiratory distress.      Breath sounds: Normal breath sounds.   Abdominal:      General: Bowel sounds are normal. There is no distension.      Palpations: Abdomen is soft.      Tenderness: There is no abdominal tenderness.   Musculoskeletal:      Right lower leg: No edema.      Left lower leg: No edema.      Comments: diffusely decreased muscle mass  Bilateral lower extremity contractures  Severe kyphosis   Skin:     General: Skin is warm and dry.   Neurological:      Mental Status: She is alert. Mental status is at baseline. She is disoriented.   Psychiatric:         Mood and Affect: Mood normal.         Behavior: Behavior normal.         Assessment/Plan   Problem List Items Addressed This Visit       Debility     Requires assistance with ADL's.           Dementia (CMS/HCC)     Oriented times 2, forgetful, slow decline.  No acute changes.          Meningioma (CMS/HCC) - Primary     S/p resection 9/2021, she and poa  declined radiation, further treatment or evaluation.          Seizures (CMS/HCC)     no known recent seizures.  Staff to monitor and notify for any seizure activity, on keppra.   Keppra level therapeutic             labs/meds/orders reviewed  staff to monitor and notify for any changes.  continue with supportive and restorative care  next labs 12/23 and prn

## 2023-08-18 NOTE — PROGRESS NOTES
Subjective   Patient ID: Karyn Owens is a 77 y.o. female who is long term resident being seen and evaluated for multiple medical problems.    HPI   This 77-year-old female patient is resting comfortably in her chair no distress.  Nursing has no new adverse events reported to me.  The patient herself has no complaints.    Current high risk medication:  Keppra  Lacosamide  Senna    Laboratory examination from June 6, 2023:  Vitamin B12 272  Vitamin D 30  Keppra 36  Potassium 4.1  Bicarbonate 20  Creatinine 0.7  Albumin 3.5  Liver injury test normal  Hemoglobin of 13.7    Review of Systems   Constitutional:  Negative for chills, diaphoresis and fever.   Respiratory:  Negative for cough and shortness of breath.    Cardiovascular:  Negative for chest pain and leg swelling.   Gastrointestinal:  Negative for constipation, diarrhea, nausea and vomiting.   Musculoskeletal:  Negative for joint swelling and myalgias.       Objective   /78   Pulse 76   Temp 36.7 °C (98.1 °F)   Resp 18   Wt (!) 42.6 kg (94 lb)   SpO2 93%   BMI 17.76 kg/m²     Physical Exam  Vitals reviewed.   Constitutional:       General: She is not in acute distress.     Appearance: She is not ill-appearing.      Comments: Frail contracted debilitated female in no distress   Cardiovascular:      Rate and Rhythm: Normal rate and regular rhythm.      Pulses: Normal pulses.      Heart sounds:      No gallop.   Pulmonary:      Breath sounds: Normal breath sounds. No wheezing, rhonchi or rales.   Abdominal:      General: Abdomen is flat. Bowel sounds are normal.      Palpations: Abdomen is soft.      Tenderness: There is no guarding or rebound.   Musculoskeletal:      Right lower leg: No edema.      Left lower leg: No edema.      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  She has flexion contractures of her lower extremities         Assessment/Plan   Problem List Items Addressed This Visit        Debility    Dementia (CMS/HCC)    Meningioma (CMS/HCC)    Osteoporosis    Seizures (CMS/HCC) - Primary       8.  We will continue with restorative and supportive care as the patient tolerates    B.  Laboratory examinations will next be due in December 2023 or as needed    C.  The patient's prognosis is guarded.

## 2023-08-18 NOTE — LETTER
Patient: Karyn Owens  : 1945    Encounter Date: 2023    Subjective  Patient ID: Karyn Owens is a 77 y.o. female who is long term resident being seen and evaluated for multiple medical problems.    HPI   This 77-year-old female patient is resting comfortably in her chair no distress.  Nursing has no new adverse events reported to me.  The patient herself has no complaints.    Current high risk medication:  Keppra  Lacosamide  Senna    Laboratory examination from 2023:  Vitamin B12 272  Vitamin D 30  Keppra 36  Potassium 4.1  Bicarbonate 20  Creatinine 0.7  Albumin 3.5  Liver injury test normal  Hemoglobin of 13.7    Review of Systems   Constitutional:  Negative for chills, diaphoresis and fever.   Respiratory:  Negative for cough and shortness of breath.    Cardiovascular:  Negative for chest pain and leg swelling.   Gastrointestinal:  Negative for constipation, diarrhea, nausea and vomiting.   Musculoskeletal:  Negative for joint swelling and myalgias.       Objective  /78   Pulse 76   Temp 36.7 °C (98.1 °F)   Resp 18   Wt (!) 42.6 kg (94 lb)   SpO2 93%   BMI 17.76 kg/m²     Physical Exam  Vitals reviewed.   Constitutional:       General: She is not in acute distress.     Appearance: She is not ill-appearing.      Comments: Frail contracted debilitated female in no distress   Cardiovascular:      Rate and Rhythm: Normal rate and regular rhythm.      Pulses: Normal pulses.      Heart sounds:      No gallop.   Pulmonary:      Breath sounds: Normal breath sounds. No wheezing, rhonchi or rales.   Abdominal:      General: Abdomen is flat. Bowel sounds are normal.      Palpations: Abdomen is soft.      Tenderness: There is no guarding or rebound.   Musculoskeletal:      Right lower leg: No edema.      Left lower leg: No edema.      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  She has flexion contractures of her lower extremities          Assessment/Plan  Problem List Items Addressed This Visit       Debility    Dementia (CMS/HCC)    Meningioma (CMS/HCC)    Osteoporosis    Seizures (CMS/HCC) - Primary       8.  We will continue with restorative and supportive care as the patient tolerates    B.  Laboratory examinations will next be due in December 2023 or as needed    C.  The patient's prognosis is guarded.      Electronically Signed By: Kamlesh Edwards MD   8/28/23  3:45 PM

## 2023-09-19 NOTE — PROGRESS NOTES
Subjective   Karyn Owens is a 77 y.o. female Here for routine monthly visit.    HPI There are no new problems and multiple health problems have been reviewed.  The course has been unchanged.  The patient  is moderately confused.   There are no family members present during time of visit.    she  is resting in bed, denies any complaints when asked.  Eating and drinking fair per staff.    No concerns per staff.       Review of Systems   Constitutional:         Difficult to obtain due to dementia, denies any complaints when asked.        Objective   /64   Pulse 64   Temp 36.6 °C (97.8 °F)   Resp 18   Wt (!) 43.5 kg (96 lb)   SpO2 95%   BMI 16.59 kg/m²     Physical Exam  Constitutional:       General: She is not in acute distress.     Comments: Frail, cachectic.    HENT:      Head: Normocephalic and atraumatic.   Eyes:      Conjunctiva/sclera: Conjunctivae normal.   Cardiovascular:      Rate and Rhythm: Normal rate and regular rhythm.   Pulmonary:      Effort: Pulmonary effort is normal. No respiratory distress.      Breath sounds: Normal breath sounds.   Abdominal:      General: Bowel sounds are normal. There is no distension.      Palpations: Abdomen is soft.      Tenderness: There is no abdominal tenderness.   Musculoskeletal:      Right lower leg: No edema.      Left lower leg: No edema.      Comments: diffusely decreased muscle mass  Bilateral lower extremity contractures  Severe kyphosis   Skin:     General: Skin is warm and dry.   Neurological:      Mental Status: She is alert. Mental status is at baseline. She is disoriented.   Psychiatric:         Mood and Affect: Mood normal.         Behavior: Behavior normal.         Assessment/Plan   Problem List Items Addressed This Visit       Debility     Requires assistance with ADL's.           Dementia (CMS/HCC)     Oriented times 2, forgetful, slow decline.  No acute changes.          Meningioma (CMS/HCC) - Primary     S/p resection 9/2021, she and poa  declined radiation, further treatment or evaluation.          Seizures (CMS/HCC)     no known recent seizures.  Staff to monitor and notify for any seizure activity, on keppra.   Keppra level therapeutic  6/23            labs/meds/orders reviewed  staff to monitor and notify for any changes.  continue with supportive and restorative care  next labs 12/23 and prn

## 2023-09-19 NOTE — ASSESSMENT & PLAN NOTE
no known recent seizures.  Staff to monitor and notify for any seizure activity, on keppra.   Keppra level therapeutic  6/23

## 2023-09-19 NOTE — LETTER
Patient: Karyn Owens  : 1945    Encounter Date: 2023    Subjective  Karyn Owens is a 77 y.o. female Here for routine monthly visit.    HPI There are no new problems and multiple health problems have been reviewed.  The course has been unchanged.  The patient  is moderately confused.   There are no family members present during time of visit.    she  is resting in bed, denies any complaints when asked.  Eating and drinking fair per staff.    No concerns per staff.       Review of Systems   Constitutional:         Difficult to obtain due to dementia, denies any complaints when asked.        Objective  /64   Pulse 64   Temp 36.6 °C (97.8 °F)   Resp 18   Wt (!) 43.5 kg (96 lb)   SpO2 95%   BMI 16.59 kg/m²     Physical Exam  Constitutional:       General: She is not in acute distress.     Comments: Frail, cachectic.    HENT:      Head: Normocephalic and atraumatic.   Eyes:      Conjunctiva/sclera: Conjunctivae normal.   Cardiovascular:      Rate and Rhythm: Normal rate and regular rhythm.   Pulmonary:      Effort: Pulmonary effort is normal. No respiratory distress.      Breath sounds: Normal breath sounds.   Abdominal:      General: Bowel sounds are normal. There is no distension.      Palpations: Abdomen is soft.      Tenderness: There is no abdominal tenderness.   Musculoskeletal:      Right lower leg: No edema.      Left lower leg: No edema.      Comments: diffusely decreased muscle mass  Bilateral lower extremity contractures  Severe kyphosis   Skin:     General: Skin is warm and dry.   Neurological:      Mental Status: She is alert. Mental status is at baseline. She is disoriented.   Psychiatric:         Mood and Affect: Mood normal.         Behavior: Behavior normal.         Assessment/Plan  Problem List Items Addressed This Visit       Debility     Requires assistance with ADL's.           Dementia (CMS/HCC)     Oriented times 2, forgetful, slow decline.  No acute changes.           Meningioma (CMS/HCC) - Primary     S/p resection 9/2021, she and poa declined radiation, further treatment or evaluation.          Seizures (CMS/HCC)     no known recent seizures.  Staff to monitor and notify for any seizure activity, on keppra.   Keppra level therapeutic  6/23            labs/meds/orders reviewed  staff to monitor and notify for any changes.  continue with supportive and restorative care  next labs 12/23 and prn      Electronically Signed By: TIMUR Mendez-CNP   9/19/23 10:28 AM

## 2023-10-19 NOTE — LETTER
Patient: Karyn Owens  : 1945    Encounter Date: 10/19/2023    Subjective  Patient ID: Karyn Owens is a 78 y.o. female who is long term resident being seen and evaluated for multiple medical problems.    HPI   This 78-year-old female patient is resting comfortably in her bed today in no acute distress.  She only complains of some discomfort of the right foot.  She has chronic contractures.  Nursing has no adverse events reported to me.  Including no skin issues on the right foot.    Current Jan medication:  Keppra  Lacosamide  Senna    Laboratory examination from 2023:  Vitamin B12 272  Vitamin D 30  TSH 0.85  Keppra 34  Potassium 4.1  Bicarbonate 29  Creatinine 0.7  Albumin 3.5  Hemoglobin 13.7  Liver injury test normal    Review of Systems   Constitutional:         Difficult to obtain due to dementia, denies any complaints when asked.        Objective  /64   Pulse 64   Temp 36.6 °C (97.9 °F)   Resp 18   Wt (!) 43.1 kg (95 lb)   SpO2 96%   BMI 16.42 kg/m²     Physical Exam  Constitutional:       General: She is not in acute distress.     Comments: Frail, cachectic.    HENT:      Head: Normocephalic and atraumatic.   Eyes:      Conjunctiva/sclera: Conjunctivae normal.   Cardiovascular:      Rate and Rhythm: Normal rate and regular rhythm.   Pulmonary:      Effort: Pulmonary effort is normal. No respiratory distress.      Breath sounds: Normal breath sounds.   Abdominal:      General: Bowel sounds are normal. There is no distension.      Palpations: Abdomen is soft.      Tenderness: There is no abdominal tenderness.   Musculoskeletal:      Right lower leg: No edema.      Left lower leg: No edema.      Comments: diffusely decreased muscle mass  Bilateral lower extremity contractures  Severe kyphosis  The right lower extremity shows severe flexion contracture of the leg and plantarflexion contractures of the right foot.  There are no skin ulcerations of the foot on the plantar aspect  where the patient complains of pain.   Skin:     General: Skin is warm and dry.   Neurological:      Mental Status: She is alert. Mental status is at baseline. She is disoriented.   Psychiatric:         Mood and Affect: Mood normal.         Behavior: Behavior normal.         Assessment/Plan  Problem List Items Addressed This Visit             ICD-10-CM    Debility R53.81    Dementia (CMS/[facility]) F03.90    Meningioma (CMS/[facility]) D32.9    Seizures (CMS/[facility]) - Primary R56.9     A.  We will continue with supportive and restorative care as the patient tolerates    B.  The patient's right foot will be padded and protected.  Exam of the foot failed to reveal any skin issues or ulceration.  The patient has chronic leg contractures and foot contractures as well.    C.  The patient's laboratory examinations will next be due in December 2023 or as needed    D.  The patient's prognosis is guarded.        Electronically Signed By: Kamlesh Edwards MD   10/26/23  3:35 PM

## 2023-10-24 NOTE — PROGRESS NOTES
Subjective   Patient ID: Karyn Owens is a 78 y.o. female who is long term resident being seen and evaluated for multiple medical problems.    HPI   This 78-year-old female patient is resting comfortably in her bed today in no acute distress.  She only complains of some discomfort of the right foot.  She has chronic contractures.  Nursing has no adverse events reported to me.  Including no skin issues on the right foot.    Current Jan medication:  Keppra  Lacosamide  Senna    Laboratory examination from June 2023:  Vitamin B12 272  Vitamin D 30  TSH 0.85  Keppra 34  Potassium 4.1  Bicarbonate 29  Creatinine 0.7  Albumin 3.5  Hemoglobin 13.7  Liver injury test normal    Review of Systems   Constitutional:         Difficult to obtain due to dementia, denies any complaints when asked.        Objective   /64   Pulse 64   Temp 36.6 °C (97.9 °F)   Resp 18   Wt (!) 43.1 kg (95 lb)   SpO2 96%   BMI 16.42 kg/m²     Physical Exam  Constitutional:       General: She is not in acute distress.     Comments: Frail, cachectic.    HENT:      Head: Normocephalic and atraumatic.   Eyes:      Conjunctiva/sclera: Conjunctivae normal.   Cardiovascular:      Rate and Rhythm: Normal rate and regular rhythm.   Pulmonary:      Effort: Pulmonary effort is normal. No respiratory distress.      Breath sounds: Normal breath sounds.   Abdominal:      General: Bowel sounds are normal. There is no distension.      Palpations: Abdomen is soft.      Tenderness: There is no abdominal tenderness.   Musculoskeletal:      Right lower leg: No edema.      Left lower leg: No edema.      Comments: diffusely decreased muscle mass  Bilateral lower extremity contractures  Severe kyphosis  The right lower extremity shows severe flexion contracture of the leg and plantarflexion contractures of the right foot.  There are no skin ulcerations of the foot on the plantar aspect where the patient complains of pain.   Skin:     General: Skin is warm  and dry.   Neurological:      Mental Status: She is alert. Mental status is at baseline. She is disoriented.   Psychiatric:         Mood and Affect: Mood normal.         Behavior: Behavior normal.         Assessment/Plan   Problem List Items Addressed This Visit             ICD-10-CM    Debility R53.81    Dementia (CMS/[facility]) F03.90    Meningioma (CMS/[facility]) D32.9    Seizures (CMS/[facility]) - Primary R56.9     A.  We will continue with supportive and restorative care as the patient tolerates    B.  The patient's right foot will be padded and protected.  Exam of the foot failed to reveal any skin issues or ulceration.  The patient has chronic leg contractures and foot contractures as well.    C.  The patient's laboratory examinations will next be due in December 2023 or as needed    D.  The patient's prognosis is guarded.

## 2023-11-17 NOTE — LETTER
Patient: Karyn Owens  : 1945    Encounter Date: 2023    Subjective  Patient ID: Karyn Owens is a 78 y.o. female who is long term resident being seen and evaluated for multiple medical problems.    HPI   78-year-old female patient is resting comfortably in her room in no distress.  She has no complaints of pain for me.  Nursing has no new adverse events reported to me.    Current high risk medication:  Keppra  Lacosamide  Senna    Laboratory examination from 2023:  Vitamin B12 272  Vitamin D 30  Keppra 34  Potassium 4.1  Bicarbonate 29  Creatinine 0.7  Albumin 3.5  Liver injury test normal  CBC normal    Review of Systems   Constitutional:         Difficult to obtain due to dementia, denies any complaints when asked.        Objective  /64   Pulse 64   Temp 36.9 °C (98.5 °F)   Resp 18   Wt (!) 42.6 kg (94 lb)   SpO2 96%   BMI 16.25 kg/m²     Physical Exam  Constitutional:       General: She is not in acute distress.     Comments: Frail, cachectic.    HENT:      Head: Normocephalic and atraumatic.   Eyes:      Conjunctiva/sclera: Conjunctivae normal.   Cardiovascular:      Rate and Rhythm: Normal rate and regular rhythm.   Pulmonary:      Effort: Pulmonary effort is normal. No respiratory distress.      Breath sounds: Normal breath sounds.   Abdominal:      General: Bowel sounds are normal. There is no distension.      Palpations: Abdomen is soft.      Tenderness: There is no abdominal tenderness.   Musculoskeletal:      Right lower leg: No edema.      Left lower leg: No edema.      Comments: diffusely decreased muscle mass  Bilateral lower extremity contractures  Severe kyphosis  The right lower extremity shows severe flexion contracture of the leg and plantarflexion contractures of the right foot.  There are no skin ulcerations of the foot on the plantar aspect where the patient complains of pain.   Skin:     General: Skin is warm and dry.   Neurological:      Mental Status: She  is alert. Mental status is at baseline. She is disoriented.   Psychiatric:         Mood and Affect: Mood normal.         Behavior: Behavior normal.         Assessment/Plan  Problem List Items Addressed This Visit             ICD-10-CM    Debility R53.81    Dementia (CMS/[facility]) F03.90    Meningioma (CMS/[facility]) D32.9    Seizures (CMS/[facility]) - Primary R56.9     A.  We will continue with restorative and supportive care as the patient tolerates    B.  Laboratory examinations will next be due in December 2023 or as needed    C.  The patient's prognosis is guarded.        Electronically Signed By: Kamlesh Edwards MD   11/29/23  5:12 PM

## 2023-11-17 NOTE — PROGRESS NOTES
Subjective   Patient ID: Karyn Owens is a 78 y.o. female who is long term resident being seen and evaluated for multiple medical problems.    HPI   78-year-old female patient is resting comfortably in her room in no distress.  She has no complaints of pain for me.  Nursing has no new adverse events reported to me.    Current high risk medication:  Keppra  Lacosamide  Senna    Laboratory examination from June 2023:  Vitamin B12 272  Vitamin D 30  Keppra 34  Potassium 4.1  Bicarbonate 29  Creatinine 0.7  Albumin 3.5  Liver injury test normal  CBC normal    Review of Systems   Constitutional:         Difficult to obtain due to dementia, denies any complaints when asked.        Objective   /64   Pulse 64   Temp 36.9 °C (98.5 °F)   Resp 18   Wt (!) 42.6 kg (94 lb)   SpO2 96%   BMI 16.25 kg/m²     Physical Exam  Constitutional:       General: She is not in acute distress.     Comments: Frail, cachectic.    HENT:      Head: Normocephalic and atraumatic.   Eyes:      Conjunctiva/sclera: Conjunctivae normal.   Cardiovascular:      Rate and Rhythm: Normal rate and regular rhythm.   Pulmonary:      Effort: Pulmonary effort is normal. No respiratory distress.      Breath sounds: Normal breath sounds.   Abdominal:      General: Bowel sounds are normal. There is no distension.      Palpations: Abdomen is soft.      Tenderness: There is no abdominal tenderness.   Musculoskeletal:      Right lower leg: No edema.      Left lower leg: No edema.      Comments: diffusely decreased muscle mass  Bilateral lower extremity contractures  Severe kyphosis  The right lower extremity shows severe flexion contracture of the leg and plantarflexion contractures of the right foot.  There are no skin ulcerations of the foot on the plantar aspect where the patient complains of pain.   Skin:     General: Skin is warm and dry.   Neurological:      Mental Status: She is alert. Mental status is at baseline. She is disoriented.    Psychiatric:         Mood and Affect: Mood normal.         Behavior: Behavior normal.         Assessment/Plan   Problem List Items Addressed This Visit             ICD-10-CM    Debility R53.81    Dementia (CMS/[facility]) F03.90    Meningioma (CMS/[facility]) D32.9    Seizures (CMS/[facility]) - Primary R56.9     A.  We will continue with restorative and supportive care as the patient tolerates    B.  Laboratory examinations will next be due in December 2023 or as needed    C.  The patient's prognosis is guarded.

## 2023-11-28 NOTE — LETTER
Patient: Karyn Owens  : 1945    Encounter Date: 2023    Subjective  Karyn Owens is a 78 y.o. female Here for routine monthly visit.    HPI There are no new problems and multiple health problems have been reviewed.  The course has been unchanged.  The patient  is moderately confused.   There are no family members present during time of visit.    she  is sitting up in chair,  denies any complaints when asked.  Eating and drinking fair per staff.    No concerns per staff.       Review of Systems   Constitutional:         Difficult to obtain due to dementia, denies any complaints when asked.        Objective  /64   Pulse 64   Temp 36.6 °C (97.9 °F)   Resp 18   Wt (!) 42.2 kg (93 lb)   SpO2 96%   BMI 16.07 kg/m²     Physical Exam  Constitutional:       General: She is not in acute distress.     Comments: Frail, cachectic.    HENT:      Head: Normocephalic and atraumatic.   Eyes:      Conjunctiva/sclera: Conjunctivae normal.   Cardiovascular:      Rate and Rhythm: Normal rate and regular rhythm.   Pulmonary:      Effort: Pulmonary effort is normal. No respiratory distress.      Breath sounds: Normal breath sounds.   Abdominal:      General: Bowel sounds are normal. There is no distension.      Palpations: Abdomen is soft.      Tenderness: There is no abdominal tenderness.   Musculoskeletal:      Right lower leg: No edema.      Left lower leg: No edema.      Comments: diffusely decreased muscle mass  Bilateral lower extremity contractures  Severe kyphosis   Skin:     General: Skin is warm and dry.   Neurological:      Mental Status: She is alert. Mental status is at baseline. She is disoriented.   Psychiatric:         Mood and Affect: Mood normal.         Behavior: Behavior normal.         Assessment/Plan  Problem List Items Addressed This Visit       Debility     Requires assistance with ADL's.           Dementia (CMS/[facility]) - Primary     Oriented times 2, forgetful, slow decline.  No acute  changes.          Meningioma (CMS/[facility]CC)     S/p resection 9/2021, she and poa declined radiation, further treatment or evaluation.          Seizures (CMS/HCC)     no known recent seizures.  Staff to monitor and notify for any seizure activity, on keppra.   Keppra level therapeutic  6/23            labs/meds/orders reviewed  staff to monitor and notify for any changes.  continue with supportive and restorative care  She is due for labs next month, will order.        Electronically Signed By: KAREN Mendez   11/28/23  1:52 PM

## 2023-11-28 NOTE — PROGRESS NOTES
Subjective   Karyn Owens is a 78 y.o. female Here for routine monthly visit.    HPI There are no new problems and multiple health problems have been reviewed.  The course has been unchanged.  The patient  is moderately confused.   There are no family members present during time of visit.    she  is sitting up in chair,  denies any complaints when asked.  Eating and drinking fair per staff.    No concerns per staff.       Review of Systems   Constitutional:         Difficult to obtain due to dementia, denies any complaints when asked.        Objective   /64   Pulse 64   Temp 36.6 °C (97.9 °F)   Resp 18   Wt (!) 42.2 kg (93 lb)   SpO2 96%   BMI 16.07 kg/m²     Physical Exam  Constitutional:       General: She is not in acute distress.     Comments: Frail, cachectic.    HENT:      Head: Normocephalic and atraumatic.   Eyes:      Conjunctiva/sclera: Conjunctivae normal.   Cardiovascular:      Rate and Rhythm: Normal rate and regular rhythm.   Pulmonary:      Effort: Pulmonary effort is normal. No respiratory distress.      Breath sounds: Normal breath sounds.   Abdominal:      General: Bowel sounds are normal. There is no distension.      Palpations: Abdomen is soft.      Tenderness: There is no abdominal tenderness.   Musculoskeletal:      Right lower leg: No edema.      Left lower leg: No edema.      Comments: diffusely decreased muscle mass  Bilateral lower extremity contractures  Severe kyphosis   Skin:     General: Skin is warm and dry.   Neurological:      Mental Status: She is alert. Mental status is at baseline. She is disoriented.   Psychiatric:         Mood and Affect: Mood normal.         Behavior: Behavior normal.         Assessment/Plan   Problem List Items Addressed This Visit       Debility     Requires assistance with ADL's.           Dementia (CMS/[facility]) - Primary     Oriented times 2, forgetful, slow decline.  No acute changes.          Meningioma (CMS/[facility])     S/p resection 9/2021, she  and poa declined radiation, further treatment or evaluation.          Seizures (CMS/[facility])     no known recent seizures.  Staff to monitor and notify for any seizure activity, on keppra.   Keppra level therapeutic  6/23            labs/meds/orders reviewed  staff to monitor and notify for any changes.  continue with supportive and restorative care  She is due for labs next month, will order.

## 2023-12-15 NOTE — LETTER
Patient: Karyn Owens  : 1945    Encounter Date: 12/15/2023    Subjective  Patient ID: Karyn Owens is a 78 y.o. female who is long term resident being seen and evaluated for multiple medical problems.    HPI   78-year-old female patient is resting comfortably in her bed in no distress.  She has absolutely no complaints for me and is quite pleasant today.    The patient's medication list has been reviewed in detail by me.    The patient's Keppra dose has been adjusted downward to accommodate for abnormal laboratory examination    The patient's laboratory examination from 2023 have been reviewed by me and are unremarkable except for mildly increased Keppra at 48.    Review of Systems   Constitutional:         Difficult to obtain due to dementia, denies any complaints when asked.        Objective  /63   Pulse 78   Temp 36.7 °C (98.1 °F)   Resp 18   Wt (!) 44 kg (97 lb)   SpO2 94%   BMI 16.77 kg/m²     Physical Exam  Constitutional:       General: She is not in acute distress.     Comments: Frail, cachectic.    HENT:      Head: Normocephalic and atraumatic.   Eyes:      Conjunctiva/sclera: Conjunctivae normal.   Cardiovascular:      Rate and Rhythm: Normal rate and regular rhythm.   Pulmonary:      Effort: Pulmonary effort is normal. No respiratory distress.      Breath sounds: Normal breath sounds.   Abdominal:      General: Bowel sounds are normal. There is no distension.      Palpations: Abdomen is soft.      Tenderness: There is no abdominal tenderness.   Musculoskeletal:      Right lower leg: No edema.      Left lower leg: No edema.      Comments: diffusely decreased muscle mass  Bilateral lower extremity contractures  Severe kyphosis   Skin:     General: Skin is warm and dry.   Neurological:      Mental Status: She is alert. Mental status is at baseline. She is disoriented.   Psychiatric:         Mood and Affect: Mood normal.         Behavior: Behavior normal.          Assessment/Plan  Problem List Items Addressed This Visit             ICD-10-CM    Debility R53.81    Dementia (CMS/[facility]) F03.90    Meningioma (CMS/[facility]) D32.9    Osteoporosis M81.0    Seizures (CMS/[facility]) - Primary R56.9       8.  We will continue with rehabilitative restorative and supportive care as patient tolerates    B.  Laboratory examinations will next be due and June 2024 or as needed    C.  The patient's Keppra dose has been adjusted downward to accommodate for mild Keppra toxicity    D.  The patient's prognosis is guarded.      Electronically Signed By: Kamlesh Edwards MD   12/27/23  5:37 PM

## 2023-12-15 NOTE — PROGRESS NOTES
Subjective   Patient ID: Karyn Owens is a 78 y.o. female who is long term resident being seen and evaluated for multiple medical problems.    HPI   78-year-old female patient is resting comfortably in her bed in no distress.  She has absolutely no complaints for me and is quite pleasant today.    The patient's medication list has been reviewed in detail by me.    The patient's Keppra dose has been adjusted downward to accommodate for abnormal laboratory examination    The patient's laboratory examination from December 4, 2023 have been reviewed by me and are unremarkable except for mildly increased Keppra at 48.    Review of Systems   Constitutional:         Difficult to obtain due to dementia, denies any complaints when asked.        Objective   /63   Pulse 78   Temp 36.7 °C (98.1 °F)   Resp 18   Wt (!) 44 kg (97 lb)   SpO2 94%   BMI 16.77 kg/m²     Physical Exam  Constitutional:       General: She is not in acute distress.     Comments: Frail, cachectic.    HENT:      Head: Normocephalic and atraumatic.   Eyes:      Conjunctiva/sclera: Conjunctivae normal.   Cardiovascular:      Rate and Rhythm: Normal rate and regular rhythm.   Pulmonary:      Effort: Pulmonary effort is normal. No respiratory distress.      Breath sounds: Normal breath sounds.   Abdominal:      General: Bowel sounds are normal. There is no distension.      Palpations: Abdomen is soft.      Tenderness: There is no abdominal tenderness.   Musculoskeletal:      Right lower leg: No edema.      Left lower leg: No edema.      Comments: diffusely decreased muscle mass  Bilateral lower extremity contractures  Severe kyphosis   Skin:     General: Skin is warm and dry.   Neurological:      Mental Status: She is alert. Mental status is at baseline. She is disoriented.   Psychiatric:         Mood and Affect: Mood normal.         Behavior: Behavior normal.         Assessment/Plan   Problem List Items Addressed This Visit             ICD-10-CM     Debility R53.81    Dementia (CMS/[facility]) F03.90    Meningioma (CMS/[facility]) D32.9    Osteoporosis M81.0    Seizures (CMS/[facility]) - Primary R56.9       8.  We will continue with rehabilitative restorative and supportive care as patient tolerates    B.  Laboratory examinations will next be due and June 2024 or as needed    C.  The patient's Keppra dose has been adjusted downward to accommodate for mild Keppra toxicity    D.  The patient's prognosis is guarded.

## 2024-01-01 ENCOUNTER — NURSING HOME VISIT (OUTPATIENT)
Dept: POST ACUTE CARE | Facility: EXTERNAL LOCATION | Age: 79
End: 2024-01-01
Payer: MEDICARE

## 2024-01-01 ENCOUNTER — TELEPHONE (OUTPATIENT)
Dept: PRIMARY CARE | Facility: CLINIC | Age: 79
End: 2024-01-01
Payer: MEDICARE

## 2024-01-01 ENCOUNTER — TELEPHONE (OUTPATIENT)
Dept: PRIMARY CARE | Facility: CLINIC | Age: 79
End: 2024-01-01

## 2024-01-01 VITALS
OXYGEN SATURATION: 95 % | BODY MASS INDEX: 16.77 KG/M2 | SYSTOLIC BLOOD PRESSURE: 126 MMHG | TEMPERATURE: 97.7 F | RESPIRATION RATE: 18 BRPM | DIASTOLIC BLOOD PRESSURE: 68 MMHG | WEIGHT: 97 LBS | HEART RATE: 68 BPM

## 2024-01-01 VITALS
OXYGEN SATURATION: 95 % | RESPIRATION RATE: 18 BRPM | TEMPERATURE: 98.1 F | WEIGHT: 97 LBS | DIASTOLIC BLOOD PRESSURE: 68 MMHG | HEART RATE: 68 BPM | BODY MASS INDEX: 16.77 KG/M2 | SYSTOLIC BLOOD PRESSURE: 126 MMHG

## 2024-01-01 VITALS
SYSTOLIC BLOOD PRESSURE: 112 MMHG | WEIGHT: 94 LBS | RESPIRATION RATE: 18 BRPM | BODY MASS INDEX: 16.25 KG/M2 | OXYGEN SATURATION: 96 % | HEART RATE: 88 BPM | TEMPERATURE: 98.3 F | DIASTOLIC BLOOD PRESSURE: 78 MMHG

## 2024-01-01 VITALS
BODY MASS INDEX: 16.25 KG/M2 | RESPIRATION RATE: 18 BRPM | DIASTOLIC BLOOD PRESSURE: 78 MMHG | OXYGEN SATURATION: 96 % | TEMPERATURE: 98.4 F | SYSTOLIC BLOOD PRESSURE: 112 MMHG | HEART RATE: 88 BPM | WEIGHT: 94 LBS

## 2024-01-01 VITALS
BODY MASS INDEX: 17.11 KG/M2 | WEIGHT: 99 LBS | OXYGEN SATURATION: 94 % | RESPIRATION RATE: 18 BRPM | HEART RATE: 78 BPM | DIASTOLIC BLOOD PRESSURE: 63 MMHG | SYSTOLIC BLOOD PRESSURE: 133 MMHG | TEMPERATURE: 98 F

## 2024-01-01 VITALS
TEMPERATURE: 98 F | DIASTOLIC BLOOD PRESSURE: 83 MMHG | BODY MASS INDEX: 17.28 KG/M2 | HEART RATE: 78 BPM | OXYGEN SATURATION: 94 % | RESPIRATION RATE: 18 BRPM | SYSTOLIC BLOOD PRESSURE: 133 MMHG | WEIGHT: 100 LBS

## 2024-01-01 DIAGNOSIS — R53.81 DEBILITY: ICD-10-CM

## 2024-01-01 DIAGNOSIS — G91.9 HYDROCEPHALUS, UNSPECIFIED TYPE (MULTI): ICD-10-CM

## 2024-01-01 DIAGNOSIS — F02.C0 SEVERE ALZHEIMER'S DEMENTIA WITHOUT BEHAVIORAL DISTURBANCE, PSYCHOTIC DISTURBANCE, MOOD DISTURBANCE, OR ANXIETY, UNSPECIFIED TIMING OF DEMENTIA ONSET (MULTI): ICD-10-CM

## 2024-01-01 DIAGNOSIS — R56.9 SEIZURES (MULTI): ICD-10-CM

## 2024-01-01 DIAGNOSIS — D32.9 MENINGIOMA (MULTI): ICD-10-CM

## 2024-01-01 DIAGNOSIS — G30.9 SEVERE ALZHEIMER'S DEMENTIA WITHOUT BEHAVIORAL DISTURBANCE, PSYCHOTIC DISTURBANCE, MOOD DISTURBANCE, OR ANXIETY, UNSPECIFIED TIMING OF DEMENTIA ONSET (MULTI): ICD-10-CM

## 2024-01-01 DIAGNOSIS — E46 PROTEIN-CALORIE MALNUTRITION, UNSPECIFIED SEVERITY (MULTI): Primary | ICD-10-CM

## 2024-01-01 DIAGNOSIS — R63.4 WEIGHT LOSS: Primary | ICD-10-CM

## 2024-01-01 DIAGNOSIS — R05.1 ACUTE COUGH: Primary | ICD-10-CM

## 2024-01-01 DIAGNOSIS — R63.4 WEIGHT LOSS: ICD-10-CM

## 2024-01-01 DIAGNOSIS — Z51.5 PALLIATIVE CARE PATIENT: Primary | ICD-10-CM

## 2024-01-01 DIAGNOSIS — R56.9 SEIZURES (MULTI): Primary | ICD-10-CM

## 2024-01-01 DIAGNOSIS — M81.0 OSTEOPOROSIS, UNSPECIFIED OSTEOPOROSIS TYPE, UNSPECIFIED PATHOLOGICAL FRACTURE PRESENCE: ICD-10-CM

## 2024-01-01 DIAGNOSIS — E46 PROTEIN-CALORIE MALNUTRITION, UNSPECIFIED SEVERITY (MULTI): ICD-10-CM

## 2024-01-01 DIAGNOSIS — D32.9 MENINGIOMA (MULTI): Primary | ICD-10-CM

## 2024-01-01 DIAGNOSIS — Z51.5 PALLIATIVE CARE PATIENT: ICD-10-CM

## 2024-01-01 PROCEDURE — 99308 SBSQ NF CARE LOW MDM 20: CPT | Performed by: INTERNAL MEDICINE

## 2024-01-01 PROCEDURE — 99309 SBSQ NF CARE MODERATE MDM 30: CPT | Performed by: INTERNAL MEDICINE

## 2024-01-01 PROCEDURE — 99309 SBSQ NF CARE MODERATE MDM 30: CPT | Performed by: NURSE PRACTITIONER

## 2024-01-01 RX ORDER — LACOSAMIDE 100 MG/1
100 TABLET ORAL 2 TIMES DAILY
Qty: 360 TABLET | Refills: 0 | Status: SHIPPED | OUTPATIENT
Start: 2024-01-01 | End: 2024-01-01 | Stop reason: SDUPTHER

## 2024-01-01 RX ORDER — LORAZEPAM 0.5 MG/1
0.5 TABLET ORAL 4 TIMES DAILY PRN
Qty: 120 TABLET | Refills: 5 | Status: SHIPPED | OUTPATIENT
Start: 2024-01-01 | End: 2024-01-01 | Stop reason: SDUPTHER

## 2024-01-01 RX ORDER — MORPHINE SULFATE 20 MG/ML
6-10 SOLUTION ORAL EVERY 2 HOUR PRN
Qty: 30 ML | Refills: 0 | Status: SHIPPED | OUTPATIENT
Start: 2024-01-01 | End: 2024-01-01 | Stop reason: SDUPTHER

## 2024-01-01 RX ORDER — LACOSAMIDE 100 MG/1
100 TABLET ORAL 2 TIMES DAILY
Qty: 360 TABLET | Refills: 0 | Status: SHIPPED | OUTPATIENT
Start: 2024-01-01 | End: 2024-03-30

## 2024-01-01 RX ORDER — MORPHINE SULFATE 20 MG/ML
6-10 SOLUTION ORAL EVERY 2 HOUR PRN
Qty: 30 ML | Refills: 0 | Status: SHIPPED | OUTPATIENT
Start: 2024-01-01 | End: 2024-03-30

## 2024-01-01 RX ORDER — LORAZEPAM 0.5 MG/1
0.5 TABLET ORAL 4 TIMES DAILY PRN
Qty: 120 TABLET | Refills: 5 | Status: SHIPPED | OUTPATIENT
Start: 2024-01-01 | End: 2024-03-30

## 2024-01-18 NOTE — LETTER
Patient: Karyn Owens  : 1945    Encounter Date: 2024    Subjective  Patient ID: Karyn Owens is a 78 y.o. female who is long term resident being seen and evaluated for multiple medical problems.    HPI   This 78-year-old female patient is resting comfortably in her bed in no distress.  She has no new complaints for me.  Nursing has no new adverse events reported to me.    Current high risk medication:  Keppra  Lacosamide  Senna    Laboratory examination from 2024:  Keppra 40  Laboratory examination from 2023:  Potassium 4.2  Bicarbonate 25  Creatinine 0.7  Hemoglobin 14.5  Albumin 3.8  Liver injury test normal    Review of Systems   Constitutional:         Difficult to obtain due to dementia, denies any complaints when asked.        Objective  /83   Pulse 78   Temp 36.7 °C (98 °F)   Resp 18   Wt 45.4 kg (100 lb)   SpO2 94%   BMI 17.28 kg/m²     Physical Exam  Constitutional:       General: She is not in acute distress.     Comments: Frail, cachectic.    HENT:      Head: Normocephalic and atraumatic.   Eyes:      Conjunctiva/sclera: Conjunctivae normal.   Cardiovascular:      Rate and Rhythm: Normal rate and regular rhythm.   Pulmonary:      Effort: Pulmonary effort is normal. No respiratory distress.      Breath sounds: Normal breath sounds.   Abdominal:      General: Bowel sounds are normal. There is no distension.      Palpations: Abdomen is soft.      Tenderness: There is no abdominal tenderness.   Musculoskeletal:      Right lower leg: No edema.      Left lower leg: No edema.      Comments: diffusely decreased muscle mass  Bilateral lower extremity contractures  Severe kyphosis   Skin:     General: Skin is warm and dry.   Neurological:      Mental Status: She is alert. Mental status is at baseline. She is disoriented.   Psychiatric:         Mood and Affect: Mood normal.         Behavior: Behavior normal.         Assessment/Plan  Problem List Items Addressed This  Visit             ICD-10-CM    Debility R53.81    Dementia (CMS/[facility]CC) F03.90    Meningioma (CMS/[facility]) - Primary D32.9    Seizures (CMS/[facility]CC) R56.9     A.  We will continue with restorative and supportive care as the patient tolerates    B.  Laboratory examinations will next be due in June 2024 or as needed    C.  The patient's prognosis is guarded.        Electronically Signed By: Kamlesh Edwards MD   1/24/24  4:44 PM

## 2024-01-18 NOTE — PROGRESS NOTES
Subjective   Patient ID: Karyn Owens is a 78 y.o. female who is long term resident being seen and evaluated for multiple medical problems.    HPI   This 78-year-old female patient is resting comfortably in her bed in no distress.  She has no new complaints for me.  Nursing has no new adverse events reported to me.    Current high risk medication:  Keppra  Lacosamide  Senna    Laboratory examination from January 2024:  Keppra 40  Laboratory examination from December 2023:  Potassium 4.2  Bicarbonate 25  Creatinine 0.7  Hemoglobin 14.5  Albumin 3.8  Liver injury test normal    Review of Systems   Constitutional:         Difficult to obtain due to dementia, denies any complaints when asked.        Objective   /83   Pulse 78   Temp 36.7 °C (98 °F)   Resp 18   Wt 45.4 kg (100 lb)   SpO2 94%   BMI 17.28 kg/m²     Physical Exam  Constitutional:       General: She is not in acute distress.     Comments: Frail, cachectic.    HENT:      Head: Normocephalic and atraumatic.   Eyes:      Conjunctiva/sclera: Conjunctivae normal.   Cardiovascular:      Rate and Rhythm: Normal rate and regular rhythm.   Pulmonary:      Effort: Pulmonary effort is normal. No respiratory distress.      Breath sounds: Normal breath sounds.   Abdominal:      General: Bowel sounds are normal. There is no distension.      Palpations: Abdomen is soft.      Tenderness: There is no abdominal tenderness.   Musculoskeletal:      Right lower leg: No edema.      Left lower leg: No edema.      Comments: diffusely decreased muscle mass  Bilateral lower extremity contractures  Severe kyphosis   Skin:     General: Skin is warm and dry.   Neurological:      Mental Status: She is alert. Mental status is at baseline. She is disoriented.   Psychiatric:         Mood and Affect: Mood normal.         Behavior: Behavior normal.         Assessment/Plan   Problem List Items Addressed This Visit             ICD-10-CM    Debility R53.81    Dementia (CMS/HCC)  F03.90    Meningioma (CMS/HCC) - Primary D32.9    Seizures (CMS/HCC) R56.9     A.  We will continue with restorative and supportive care as the patient tolerates    B.  Laboratory examinations will next be due in June 2024 or as needed    C.  The patient's prognosis is guarded.

## 2024-01-30 PROBLEM — E46 PROTEIN-CALORIE MALNUTRITION, UNSPECIFIED SEVERITY (MULTI): Status: ACTIVE | Noted: 2024-01-01

## 2024-01-30 PROBLEM — G91.9 HYDROCEPHALUS, UNSPECIFIED TYPE (MULTI): Status: ACTIVE | Noted: 2024-01-01

## 2024-01-30 NOTE — ASSESSMENT & PLAN NOTE
no known recent seizures.  Staff to monitor and notify for any seizure activity, on keppra.   Keppra level therapeutic  12/23

## 2024-01-30 NOTE — PROGRESS NOTES
Subjective   Karyn Owens is a 78 y.o. female Here for routine monthly visit.    HPI There are no new problems and multiple health problems have been reviewed.  The course has been unchanged.  The patient  is moderately confused.   There are no family members present during time of visit.    she  is sitting up in chair,  denies any complaints when asked.  Eating and drinking fair per staff.    No concerns per staff.       Review of Systems   Constitutional:         Difficult to obtain due to dementia, denies any complaints when asked.        Objective   /63   Pulse 78   Temp 36.7 °C (98 °F)   Resp 18   Wt (!) 44.9 kg (99 lb)   SpO2 94%   BMI 17.11 kg/m²     Physical Exam  Constitutional:       General: She is not in acute distress.     Comments: Frail, cachectic.    HENT:      Head: Normocephalic and atraumatic.   Eyes:      Conjunctiva/sclera: Conjunctivae normal.   Cardiovascular:      Rate and Rhythm: Normal rate and regular rhythm.   Pulmonary:      Effort: Pulmonary effort is normal. No respiratory distress.      Breath sounds: Normal breath sounds.   Abdominal:      General: Bowel sounds are normal. There is no distension.      Palpations: Abdomen is soft.      Tenderness: There is no abdominal tenderness.   Musculoskeletal:      Right lower leg: No edema.      Left lower leg: No edema.      Comments: diffusely decreased muscle mass  Bilateral lower extremity contractures  Severe kyphosis   Skin:     General: Skin is warm and dry.   Neurological:      Mental Status: She is alert. Mental status is at baseline. She is disoriented.   Psychiatric:         Mood and Affect: Mood normal.         Behavior: Behavior normal.         Assessment/Plan   Problem List Items Addressed This Visit       Debility     Requires assistance with ADL's.           Dementia (CMS/HCC)     Oriented times 2, forgetful, slow decline.  No acute changes.          Meningioma (CMS/HCC)     S/p resection 9/2021, she and poa  declined radiation, further treatment or evaluation.          Seizures (CMS/HCC)     no known recent seizures.  Staff to monitor and notify for any seizure activity, on keppra.   Keppra level therapeutic  12/23             Protein-calorie malnutrition, unspecified severity (CMS/HCC) - Primary     On supplements, monitor with labs.          Hydrocephalus, unspecified type (CMS/HCC)     She and POA have declined any further neurological evaluation        labs/meds/orders reviewed  staff to monitor and notify for any changes.  continue with supportive and restorative care  Next labs  6/24 and prn

## 2024-01-30 NOTE — LETTER
Patient: Karyn Owens  : 1945    Encounter Date: 2024    Subjective  Karyn Owens is a 78 y.o. female Here for routine monthly visit.    HPI There are no new problems and multiple health problems have been reviewed.  The course has been unchanged.  The patient  is moderately confused.   There are no family members present during time of visit.    she  is sitting up in chair,  denies any complaints when asked.  Eating and drinking fair per staff.    No concerns per staff.       Review of Systems   Constitutional:         Difficult to obtain due to dementia, denies any complaints when asked.        Objective  /63   Pulse 78   Temp 36.7 °C (98 °F)   Resp 18   Wt (!) 44.9 kg (99 lb)   SpO2 94%   BMI 17.11 kg/m²     Physical Exam  Constitutional:       General: She is not in acute distress.     Comments: Frail, cachectic.    HENT:      Head: Normocephalic and atraumatic.   Eyes:      Conjunctiva/sclera: Conjunctivae normal.   Cardiovascular:      Rate and Rhythm: Normal rate and regular rhythm.   Pulmonary:      Effort: Pulmonary effort is normal. No respiratory distress.      Breath sounds: Normal breath sounds.   Abdominal:      General: Bowel sounds are normal. There is no distension.      Palpations: Abdomen is soft.      Tenderness: There is no abdominal tenderness.   Musculoskeletal:      Right lower leg: No edema.      Left lower leg: No edema.      Comments: diffusely decreased muscle mass  Bilateral lower extremity contractures  Severe kyphosis   Skin:     General: Skin is warm and dry.   Neurological:      Mental Status: She is alert. Mental status is at baseline. She is disoriented.   Psychiatric:         Mood and Affect: Mood normal.         Behavior: Behavior normal.         Assessment/Plan  Problem List Items Addressed This Visit       Debility     Requires assistance with ADL's.           Dementia (CMS/HCC)     Oriented times 2, forgetful, slow decline.  No acute changes.           Meningioma (CMS/HCC)     S/p resection 9/2021, she and poa declined radiation, further treatment or evaluation.          Seizures (CMS/HCC)     no known recent seizures.  Staff to monitor and notify for any seizure activity, on keppra.   Keppra level therapeutic  12/23             Protein-calorie malnutrition, unspecified severity (CMS/HCC) - Primary     On supplements, monitor with labs.          Hydrocephalus, unspecified type (CMS/HCC)     She and POA have declined any further neurological evaluation        labs/meds/orders reviewed  staff to monitor and notify for any changes.  continue with supportive and restorative care  Next labs  6/24 and prn      Electronically Signed By: TIMUR Mendez-CNP   1/30/24  2:07 PM

## 2024-02-23 NOTE — PROGRESS NOTES
Subjective   Patient ID: Karyn Owens is a 78 y.o. female who is long term resident being seen and evaluated for multiple medical problems.    HPI   This 78-year-old female patient is resting comfortably in her room in no distress.  She has no new complaints other than mild fatigue today.  Nursing has no other adverse events reported to me.    Current high risk medication:  Keppra  Lacosamide  Senna    Laboratory examination from January 2024:  Keppra 40  Laboratory examination from December 2023:  Potassium 4.2  Bicarbonate 25  Creatinine 0.7  Albumin 3 0.8  Liver injury test normal  CBC normal  Laboratory examination from January 2023:  Vitamin B12 TSH and vitamin D normal    Review of Systems   Constitutional:         Difficult to obtain due to dementia, denies any complaints when asked.        Objective   /68   Pulse 68   Temp 36.5 °C (97.7 °F)   Resp 18   Wt (!) 44 kg (97 lb)   SpO2 95%   BMI 16.77 kg/m²     Physical Exam  Constitutional:       General: She is not in acute distress.     Comments: Frail, cachectic.    HENT:      Head: Normocephalic and atraumatic.   Eyes:      Conjunctiva/sclera: Conjunctivae normal.   Cardiovascular:      Rate and Rhythm: Normal rate and regular rhythm.   Pulmonary:      Effort: Pulmonary effort is normal. No respiratory distress.      Breath sounds: Normal breath sounds.   Abdominal:      General: Bowel sounds are normal. There is no distension.      Palpations: Abdomen is soft.      Tenderness: There is no abdominal tenderness.   Musculoskeletal:      Right lower leg: No edema.      Left lower leg: No edema.      Comments: diffusely decreased muscle mass  Bilateral lower extremity contractures  Severe kyphosis   Skin:     General: Skin is warm and dry.   Neurological:      Mental Status: She is alert. Mental status is at baseline. She is disoriented.   Psychiatric:         Mood and Affect: Mood normal.         Behavior: Behavior normal.         Assessment/Plan    Problem List Items Addressed This Visit             ICD-10-CM    Debility R53.81    Dementia (CMS/HCC) F03.90    Meningioma (CMS/HCC) D32.9    Osteoporosis M81.0    Seizures (CMS/HCC) - Primary R56.9    Hydrocephalus, unspecified type (CMS/HCC) G91.9         A.  We will continue with restorative and supportive care as the patient tolerates    B.  Laboratory examinations will next be due in June 2024 as needed    C.  The patient's prognosis is guarded.

## 2024-02-23 NOTE — LETTER
Patient: Karyn Owens  : 1945    Encounter Date: 2024    Subjective  Patient ID: Karyn Owens is a 78 y.o. female who is long term resident being seen and evaluated for multiple medical problems.    HPI   This 78-year-old female patient is resting comfortably in her room in no distress.  She has no new complaints other than mild fatigue today.  Nursing has no other adverse events reported to me.    Current high risk medication:  Keppra  Lacosamide  Senna    Laboratory examination from 2024:  Keppra 40  Laboratory examination from 2023:  Potassium 4.2  Bicarbonate 25  Creatinine 0.7  Albumin 3 0.8  Liver injury test normal  CBC normal  Laboratory examination from 2023:  Vitamin B12 TSH and vitamin D normal    Review of Systems   Constitutional:         Difficult to obtain due to dementia, denies any complaints when asked.        Objective  /68   Pulse 68   Temp 36.5 °C (97.7 °F)   Resp 18   Wt (!) 44 kg (97 lb)   SpO2 95%   BMI 16.77 kg/m²     Physical Exam  Constitutional:       General: She is not in acute distress.     Comments: Frail, cachectic.    HENT:      Head: Normocephalic and atraumatic.   Eyes:      Conjunctiva/sclera: Conjunctivae normal.   Cardiovascular:      Rate and Rhythm: Normal rate and regular rhythm.   Pulmonary:      Effort: Pulmonary effort is normal. No respiratory distress.      Breath sounds: Normal breath sounds.   Abdominal:      General: Bowel sounds are normal. There is no distension.      Palpations: Abdomen is soft.      Tenderness: There is no abdominal tenderness.   Musculoskeletal:      Right lower leg: No edema.      Left lower leg: No edema.      Comments: diffusely decreased muscle mass  Bilateral lower extremity contractures  Severe kyphosis   Skin:     General: Skin is warm and dry.   Neurological:      Mental Status: She is alert. Mental status is at baseline. She is disoriented.   Psychiatric:         Mood and Affect:  Mood normal.         Behavior: Behavior normal.         Assessment/Plan  Problem List Items Addressed This Visit             ICD-10-CM    Debility R53.81    Dementia (CMS/HCC) F03.90    Meningioma (CMS/HCC) D32.9    Osteoporosis M81.0    Seizures (CMS/HCC) - Primary R56.9    Hydrocephalus, unspecified type (CMS/HCC) G91.9         A.  We will continue with restorative and supportive care as the patient tolerates    B.  Laboratory examinations will next be due in June 2024 as needed    C.  The patient's prognosis is guarded.      Electronically Signed By: Kamlesh Edwards MD   3/2/24 12:16 PM

## 2024-02-27 PROBLEM — U07.1 COVID-19: Status: ACTIVE | Noted: 2024-01-01

## 2024-02-27 PROBLEM — R05.9 COUGH: Status: ACTIVE | Noted: 2024-01-01

## 2024-02-27 NOTE — PROGRESS NOTES
Subjective   Karyn Owens is a 78 y.o. female Here to follow up on cough.   HPI  She developed a  cough a few days ago, no fever, chills, sob.  COVID was negative.  CXR was done and showed no acute pathology.  She is resting in bed and states her cough is almost completely resolved. There are no family members present during time of visit.    she  denies any complaints when asked.  Eating and drinking well.   No concerns per staff.       Review of Systems   Constitutional:         Difficult to obtain due to dementia, denies any complaints when asked.        Objective   /68   Pulse 68   Temp 36.7 °C (98.1 °F)   Resp 18   Wt (!) 44 kg (97 lb)   SpO2 95%   BMI 16.77 kg/m²     Physical Exam  Constitutional:       General: She is not in acute distress.     Comments: Frail, cachectic.    HENT:      Head: Normocephalic and atraumatic.   Eyes:      Conjunctiva/sclera: Conjunctivae normal.   Cardiovascular:      Rate and Rhythm: Normal rate and regular rhythm.   Pulmonary:      Effort: Pulmonary effort is normal. No respiratory distress.      Breath sounds: Normal breath sounds.   Abdominal:      General: Bowel sounds are normal. There is no distension.      Palpations: Abdomen is soft.      Tenderness: There is no abdominal tenderness.   Musculoskeletal:      Right lower leg: No edema.      Left lower leg: No edema.      Comments: diffusely decreased muscle mass  Bilateral lower extremity contractures  Severe kyphosis   Skin:     General: Skin is warm and dry.   Neurological:      Mental Status: She is alert. Mental status is at baseline. She is disoriented.   Psychiatric:         Mood and Affect: Mood normal.         Behavior: Behavior normal.         Assessment/Plan   Problem List Items Addressed This Visit       Debility     Requires assistance with ADL's.           Dementia (CMS/HCC)     Oriented times 2, forgetful, slow decline.  No acute changes.          Seizures (CMS/HCC)     no known recent seizures.   Staff to monitor and notify for any seizure activity, on keppra.   Keppra level therapeutic  12/23             Cough - Primary     She developed and acute cough, no fever, chills.  Covid negative.  Cxr showed no acute pathology and she states her cough is better.  Staff to monitor and notify for any recurrence of symptoms.          labs/meds/orders reviewed  staff to monitor and notify for any changes.  continue with supportive and restorative care  Next labs  6/24 and prn

## 2024-02-27 NOTE — LETTER
Patient: Karyn Owens  : 1945    Encounter Date: 2024    Subjective  Karyn Owens is a 78 y.o. female Here to follow up on cough.   HPI  She developed a  cough a few days ago, no fever, chills, sob.  COVID was negative.  CXR was done and showed no acute pathology.  She is resting in bed and states her cough is almost completely resolved. There are no family members present during time of visit.    she  denies any complaints when asked.  Eating and drinking well.   No concerns per staff.       Review of Systems   Constitutional:         Difficult to obtain due to dementia, denies any complaints when asked.        Objective  /68   Pulse 68   Temp 36.7 °C (98.1 °F)   Resp 18   Wt (!) 44 kg (97 lb)   SpO2 95%   BMI 16.77 kg/m²     Physical Exam  Constitutional:       General: She is not in acute distress.     Comments: Frail, cachectic.    HENT:      Head: Normocephalic and atraumatic.   Eyes:      Conjunctiva/sclera: Conjunctivae normal.   Cardiovascular:      Rate and Rhythm: Normal rate and regular rhythm.   Pulmonary:      Effort: Pulmonary effort is normal. No respiratory distress.      Breath sounds: Normal breath sounds.   Abdominal:      General: Bowel sounds are normal. There is no distension.      Palpations: Abdomen is soft.      Tenderness: There is no abdominal tenderness.   Musculoskeletal:      Right lower leg: No edema.      Left lower leg: No edema.      Comments: diffusely decreased muscle mass  Bilateral lower extremity contractures  Severe kyphosis   Skin:     General: Skin is warm and dry.   Neurological:      Mental Status: She is alert. Mental status is at baseline. She is disoriented.   Psychiatric:         Mood and Affect: Mood normal.         Behavior: Behavior normal.         Assessment/Plan  Problem List Items Addressed This Visit       Debility     Requires assistance with ADL's.           Dementia (CMS/HCC)     Oriented times 2, forgetful, slow decline.  No  acute changes.          Seizures (CMS/HCC)     no known recent seizures.  Staff to monitor and notify for any seizure activity, on keppra.   Keppra level therapeutic  12/23             Cough - Primary     She developed and acute cough, no fever, chills.  Covid negative.  Cxr showed no acute pathology and she states her cough is better.  Staff to monitor and notify for any recurrence of symptoms.          labs/meds/orders reviewed  staff to monitor and notify for any changes.  continue with supportive and restorative care  Next labs  6/24 and prn      Electronically Signed By: TIMUR Mendez-CNP   2/27/24 10:35 AM

## 2024-02-27 NOTE — ASSESSMENT & PLAN NOTE
She developed and acute cough, no fever, chills.  Covid negative.  Cxr showed no acute pathology and she states her cough is better.  Staff to monitor and notify for any recurrence of symptoms.

## 2024-03-15 NOTE — PROGRESS NOTES
Subjective   Patient ID: Karyn Owens is a 78 y.o. female who is long term resident being seen and evaluated for multiple medical problems.    HPI   This 78-year-old female patient is resting comfortably in her wheelchair in no distress.  She appears to be more kyphotic even than before.  She has poor trunk control.  Nursing reports that since recovering from COVID she has had little or no oral intake of food.  The patient herself denies any pain or shortness of breath but appears extremely weak.    Current high risk medication:  Keppra  Lacosamide  Senna  Laboratory examinations have been reviewed in detail by me.    Review of Systems   Constitutional:         Difficult to obtain due to dementia, denies any complaints when asked.        Objective   /78   Pulse 88   Temp 36.8 °C (98.3 °F)   Resp 18   Wt (!) 42.6 kg (94 lb)   SpO2 96%   BMI 16.25 kg/m²     Physical Exam  Constitutional:       General: She is not in acute distress.     Comments: Frail, cachectic.    HENT:      Head: Normocephalic and atraumatic.   Eyes:      Conjunctiva/sclera: Conjunctivae normal.   Cardiovascular:      Rate and Rhythm: Normal rate and regular rhythm.   Pulmonary:      Effort: Pulmonary effort is normal. No respiratory distress.      Breath sounds: Normal breath sounds.   Abdominal:      General: Bowel sounds are normal. There is no distension.      Palpations: Abdomen is soft.      Tenderness: There is no abdominal tenderness.   Musculoskeletal:      Right lower leg: No edema.      Left lower leg: No edema.      Comments: diffusely decreased muscle mass  Bilateral lower extremity contractures  Severe kyphosis   Skin:     General: Skin is warm and dry.   Neurological:      Mental Status: She is alert. Mental status is at baseline. She is disoriented.   Psychiatric:         Mood and Affect: Mood normal.         Behavior: Behavior normal.         Assessment/Plan   Problem List Items Addressed This Visit              ICD-10-CM    Debility R53.81    Dementia (CMS/HCC) F03.90    Meningioma (CMS/HCC) D32.9    Seizures (CMS/HCC) R56.9    Weight loss R63.4    Protein-calorie malnutrition, unspecified severity (CMS/HCC) E46    Hydrocephalus, unspecified type (CMS/[facility]) G91.9     Other Visit Diagnoses         Codes    Palliative care patient    -  Primary Z51.5    Relevant Medications    LORazepam (Ativan) 0.5 mg tablet        8.  The patient's next of kin/power of  requested for this patient to receive comfort measures due to her sudden and extreme decline in overall clinical condition following recovery from COVID-19 virus infection.  At this time the patient will be afforded morphine Ativan and Levsin if needed in the future.  Prescription have been sent in via electronic prescriptive services    B.  Laboratory examinations will be deferred for the time being until it becomes clear whether the patient will recover or continue to decline.    C.  The patient's prognosis is poor.

## 2024-03-15 NOTE — Clinical Note
Patient: Karyn Owens  : 1945    Encounter Date: 03/15/2024    No notes on file    Electronically Signed By: Kamlesh Edwards MD   3/15/24  4:51 PM

## 2024-03-19 NOTE — ASSESSMENT & PLAN NOTE
She has had a slow decline, decreased oral intake, increased sleeping.  She is resting in bed, shakes head no when asked if in pain.

## 2024-03-19 NOTE — ASSESSMENT & PLAN NOTE
She has declined, decreased oral intake and is slowly failing, she and poa are meeting with hospice later today.  She does not appear to be meeting her hydration or nutritional needs and would be considered hospice appropriate.

## 2024-03-19 NOTE — TELEPHONE ENCOUNTER
Annabel at [facility] is needing verbal order to evaluate Luis rosenthal patient and treat. Please contact Annabel at [phone]

## 2024-03-19 NOTE — LETTER
Patient: Karyn Owens  : 1945    Encounter Date: 2024    Subjective  Karyn Owens is a 78 y.o. female Here to follow up on recent decline.    HPI  She has had a decline and recent COVID infection.  She has had decreased oral intake, decreased functional status and increased sleeping.  She and POA are meeting with hospice later today and would be considered hospice appropriate.   There are no family members present during time of visit.    she  denies any complaints when asked.  Eating and drinking well.   No concerns per staff.       Review of Systems   Constitutional:         Difficult to obtain due to dementia, shakes head no when asked if in pain        Objective  /78   Pulse 88   Temp 36.9 °C (98.4 °F)   Resp 18   Wt (!) 42.6 kg (94 lb)   SpO2 96%   BMI 16.25 kg/m²     Physical Exam  Constitutional:       General: She is not in acute distress.     Comments: Frail, cachectic.    HENT:      Head: Normocephalic and atraumatic.   Eyes:      Conjunctiva/sclera: Conjunctivae normal.   Cardiovascular:      Rate and Rhythm: Normal rate and regular rhythm.   Pulmonary:      Effort: Pulmonary effort is normal. No respiratory distress.      Breath sounds: Normal breath sounds.   Abdominal:      General: Bowel sounds are normal. There is no distension.      Palpations: Abdomen is soft.      Tenderness: There is no abdominal tenderness.   Musculoskeletal:      Right lower leg: No edema.      Left lower leg: No edema.      Comments: diffusely decreased muscle mass  Bilateral lower extremity contractures  Severe kyphosis   Skin:     General: Skin is warm and dry.   Neurological:      Mental Status: She is alert. Mental status is at baseline. She is disoriented.   Psychiatric:         Mood and Affect: Mood normal.         Behavior: Behavior normal.         Assessment/Plan  Problem List Items Addressed This Visit       Debility     Requires assistance with ADL's.  Decreased functional status.           Dementia (CMS/HCC)     She has had a slow decline, decreased oral intake, increased sleeping.  She is resting in bed, shakes head no when asked if in pain.          Meningioma (CMS/HCC)     S/p resection 9/2021, she and poa declined radiation, further treatment or evaluation.          Seizures (CMS/HCC)     no known recent seizures.  Staff to monitor and notify for any seizure activity, on keppra.   Keppra level therapeutic  12/23             Weight loss - Primary     She has declined, decreased oral intake and is slowly failing, she and poa are meeting with hospice later today.  She does not appear to be meeting her hydration or nutritional needs and would be considered hospice appropriate.          labs/meds/orders reviewed  staff to monitor and notify for any changes.  continue with supportive and restorative care  Next labs  6/24 and prn if she does not enter hospice care  She has had a decline and appears to be appropriate for hospice care should poa agree.       Electronically Signed By: KAREN Mendez   3/19/24 10:30 AM

## 2024-03-19 NOTE — PROGRESS NOTES
Subjective   Karyn Owens is a 78 y.o. female Here to follow up on recent decline.    HPI  She has had a decline and recent COVID infection.  She has had decreased oral intake, decreased functional status and increased sleeping.  She and POA are meeting with hospice later today and would be considered hospice appropriate.   There are no family members present during time of visit.    she  denies any complaints when asked.  Eating and drinking well.   No concerns per staff.       Review of Systems   Constitutional:         Difficult to obtain due to dementia, shakes head no when asked if in pain        Objective   /78   Pulse 88   Temp 36.9 °C (98.4 °F)   Resp 18   Wt (!) 42.6 kg (94 lb)   SpO2 96%   BMI 16.25 kg/m²     Physical Exam  Constitutional:       General: She is not in acute distress.     Comments: Frail, cachectic.    HENT:      Head: Normocephalic and atraumatic.   Eyes:      Conjunctiva/sclera: Conjunctivae normal.   Cardiovascular:      Rate and Rhythm: Normal rate and regular rhythm.   Pulmonary:      Effort: Pulmonary effort is normal. No respiratory distress.      Breath sounds: Normal breath sounds.   Abdominal:      General: Bowel sounds are normal. There is no distension.      Palpations: Abdomen is soft.      Tenderness: There is no abdominal tenderness.   Musculoskeletal:      Right lower leg: No edema.      Left lower leg: No edema.      Comments: diffusely decreased muscle mass  Bilateral lower extremity contractures  Severe kyphosis   Skin:     General: Skin is warm and dry.   Neurological:      Mental Status: She is alert. Mental status is at baseline. She is disoriented.   Psychiatric:         Mood and Affect: Mood normal.         Behavior: Behavior normal.         Assessment/Plan   Problem List Items Addressed This Visit       Debility     Requires assistance with ADL's.  Decreased functional status.          Dementia (CMS/HCC)     She has had a slow decline, decreased oral  intake, increased sleeping.  She is resting in bed, shakes head no when asked if in pain.          Meningioma (CMS/HCC)     S/p resection 9/2021, she and poa declined radiation, further treatment or evaluation.          Seizures (CMS/HCC)     no known recent seizures.  Staff to monitor and notify for any seizure activity, on keppra.   Keppra level therapeutic  12/23             Weight loss - Primary     She has declined, decreased oral intake and is slowly failing, she and poa are meeting with hospice later today.  She does not appear to be meeting her hydration or nutritional needs and would be considered hospice appropriate.          labs/meds/orders reviewed  staff to monitor and notify for any changes.  continue with supportive and restorative care  Next labs  6/24 and prn if she does not enter hospice care  She has had a decline and appears to be appropriate for hospice care should poa agree.

## 2024-03-25 ENCOUNTER — TELEPHONE (OUTPATIENT)
Dept: PRIMARY CARE | Facility: CLINIC | Age: 79
End: 2024-03-25
Payer: MEDICARE

## 2024-03-25 NOTE — TELEPHONE ENCOUNTER
Pt passed away at [place].  Please complete DC in the EDR[facility] asap as this is a cremation.     1945  DOD 3/24/2024  TOD 1:38AM

## 2024-03-25 NOTE — TELEPHONE ENCOUNTER
Please donis patient . Passed at [place]     [facility] 3/24/2024   TOD 1:38AM

## 2024-03-29 ENCOUNTER — POST MORTEM DOCUMENTATION (OUTPATIENT)
Dept: PRIMARY CARE | Facility: CLINIC | Age: 79
End: 2024-03-29
Payer: MEDICARE